# Patient Record
Sex: FEMALE | Race: WHITE | NOT HISPANIC OR LATINO | Employment: OTHER | URBAN - METROPOLITAN AREA
[De-identification: names, ages, dates, MRNs, and addresses within clinical notes are randomized per-mention and may not be internally consistent; named-entity substitution may affect disease eponyms.]

---

## 2018-02-13 ENCOUNTER — OFFICE VISIT CONVERTED (OUTPATIENT)
Dept: FAMILY MEDICINE CLINIC | Facility: CLINIC | Age: 63
End: 2018-02-13
Attending: FAMILY MEDICINE

## 2018-08-30 ENCOUNTER — OFFICE VISIT CONVERTED (OUTPATIENT)
Dept: FAMILY MEDICINE CLINIC | Facility: CLINIC | Age: 63
End: 2018-08-30
Attending: FAMILY MEDICINE

## 2018-09-12 ENCOUNTER — OFFICE VISIT CONVERTED (OUTPATIENT)
Dept: ORTHOPEDIC SURGERY | Facility: CLINIC | Age: 63
End: 2018-09-12
Attending: ORTHOPAEDIC SURGERY

## 2018-09-24 ENCOUNTER — OFFICE VISIT CONVERTED (OUTPATIENT)
Dept: FAMILY MEDICINE CLINIC | Facility: CLINIC | Age: 63
End: 2018-09-24
Attending: FAMILY MEDICINE

## 2019-03-05 ENCOUNTER — OFFICE VISIT CONVERTED (OUTPATIENT)
Dept: FAMILY MEDICINE CLINIC | Facility: CLINIC | Age: 64
End: 2019-03-05
Attending: FAMILY MEDICINE

## 2019-03-05 ENCOUNTER — HOSPITAL ENCOUNTER (OUTPATIENT)
Dept: FAMILY MEDICINE CLINIC | Facility: CLINIC | Age: 64
Discharge: HOME | End: 2019-03-05
Attending: FAMILY MEDICINE

## 2019-03-08 LAB
AMOXICILLIN+CLAV SUSC ISLT: 8
AMPICILLIN SUSC ISLT: >=32
AMPICILLIN+SULBAC SUSC ISLT: >=32
BACTERIA UR CULT: ABNORMAL
CEFAZOLIN SUSC ISLT: <=4
CEFEPIME SUSC ISLT: <=1
CEFTAZIDIME SUSC ISLT: <=1
CEFTRIAXONE SUSC ISLT: <=1
CEFUROXIME ORAL SUSC ISLT: 2
CEFUROXIME PARENTER SUSC ISLT: 2
CIPROFLOXACIN SUSC ISLT: <=0.25
ERTAPENEM SUSC ISLT: <=0.5
GENTAMICIN SUSC ISLT: <=1
LEVOFLOXACIN SUSC ISLT: <=0.12
NITROFURANTOIN SUSC ISLT: 32
TETRACYCLINE SUSC ISLT: >=16
TMP SMX SUSC ISLT: <=20
TOBRAMYCIN SUSC ISLT: <=1

## 2019-07-29 ENCOUNTER — HOSPITAL ENCOUNTER (OUTPATIENT)
Dept: LAB | Facility: HOSPITAL | Age: 64
Discharge: HOME | End: 2019-07-29
Attending: NURSE PRACTITIONER

## 2019-07-29 ENCOUNTER — OFFICE VISIT CONVERTED (OUTPATIENT)
Dept: FAMILY MEDICINE CLINIC | Facility: CLINIC | Age: 64
End: 2019-07-29
Attending: NURSE PRACTITIONER

## 2019-07-29 LAB
25(OH)D3 SERPL-MCNC: 33 NG/ML (ref 30–100)
ALBUMIN SERPL-MCNC: 4.4 G/DL (ref 3.5–5)
ALBUMIN/GLOB SERPL: 1.5 {RATIO} (ref 1.4–2.6)
ALP SERPL-CCNC: 89 U/L (ref 43–160)
ALT SERPL-CCNC: 16 U/L (ref 10–40)
ANION GAP SERPL CALC-SCNC: 18 MMOL/L (ref 8–19)
AST SERPL-CCNC: 19 U/L (ref 15–50)
BASOPHILS # BLD AUTO: 0.08 10*3/UL (ref 0–0.2)
BASOPHILS NFR BLD AUTO: 1.4 % (ref 0–3)
BILIRUB SERPL-MCNC: 0.38 MG/DL (ref 0.2–1.3)
BUN SERPL-MCNC: 12 MG/DL (ref 5–25)
BUN/CREAT SERPL: 17 {RATIO} (ref 6–20)
CALCIUM SERPL-MCNC: 9.6 MG/DL (ref 8.7–10.4)
CHLORIDE SERPL-SCNC: 101 MMOL/L (ref 99–111)
CHOLEST SERPL-MCNC: 162 MG/DL (ref 107–200)
CHOLEST/HDLC SERPL: 2.9 {RATIO} (ref 3–6)
CONV ABS IMM GRAN: 0.02 10*3/UL (ref 0–0.2)
CONV CO2: 26 MMOL/L (ref 22–32)
CONV IMMATURE GRAN: 0.4 % (ref 0–1.8)
CONV TOTAL PROTEIN: 7.3 G/DL (ref 6.3–8.2)
CREAT UR-MCNC: 0.69 MG/DL (ref 0.5–0.9)
DEPRECATED RDW RBC AUTO: 42.6 FL (ref 36.4–46.3)
EOSINOPHIL # BLD AUTO: 0.22 10*3/UL (ref 0–0.7)
EOSINOPHIL # BLD AUTO: 3.9 % (ref 0–7)
ERYTHROCYTE [DISTWIDTH] IN BLOOD BY AUTOMATED COUNT: 12.9 % (ref 11.7–14.4)
EST. AVERAGE GLUCOSE BLD GHB EST-MCNC: 114 MG/DL
GFR SERPLBLD BASED ON 1.73 SQ M-ARVRAT: >60 ML/MIN/{1.73_M2}
GLOBULIN UR ELPH-MCNC: 2.9 G/DL (ref 2–3.5)
GLUCOSE SERPL-MCNC: 91 MG/DL (ref 65–99)
HBA1C MFR BLD: 12.5 G/DL (ref 12–16)
HBA1C MFR BLD: 5.6 % (ref 3.5–5.7)
HCT VFR BLD AUTO: 38.3 % (ref 37–47)
HDLC SERPL-MCNC: 56 MG/DL (ref 40–60)
LDLC SERPL CALC-MCNC: 75 MG/DL (ref 70–100)
LYMPHOCYTES # BLD AUTO: 1.82 10*3/UL (ref 1–5)
MCH RBC QN AUTO: 29.8 PG (ref 27–31)
MCHC RBC AUTO-ENTMCNC: 32.6 G/DL (ref 33–37)
MCV RBC AUTO: 91.4 FL (ref 81–99)
MONOCYTES # BLD AUTO: 0.43 10*3/UL (ref 0.2–1.2)
MONOCYTES NFR BLD AUTO: 7.6 % (ref 3–10)
NEUTROPHILS # BLD AUTO: 3.09 10*3/UL (ref 2–8)
NEUTROPHILS NFR BLD AUTO: 54.5 % (ref 30–85)
NRBC CBCN: 0 % (ref 0–0.7)
OSMOLALITY SERPL CALC.SUM OF ELEC: 291 MOSM/KG (ref 273–304)
PLATELET # BLD AUTO: 337 10*3/UL (ref 130–400)
PMV BLD AUTO: 9.9 FL (ref 9.4–12.3)
POTASSIUM SERPL-SCNC: 3.9 MMOL/L (ref 3.5–5.3)
RBC # BLD AUTO: 4.19 10*6/UL (ref 4.2–5.4)
SODIUM SERPL-SCNC: 141 MMOL/L (ref 135–147)
T4 FREE SERPL-MCNC: 1.2 NG/DL (ref 0.9–1.8)
TRIGL SERPL-MCNC: 157 MG/DL (ref 40–150)
TSH SERPL-ACNC: 2.53 M[IU]/L (ref 0.27–4.2)
VARIANT LYMPHS NFR BLD MANUAL: 32.2 % (ref 20–45)
VLDLC SERPL-MCNC: 31 MG/DL (ref 5–37)
WBC # BLD AUTO: 5.66 10*3/UL (ref 4.8–10.8)

## 2019-08-12 ENCOUNTER — HOSPITAL ENCOUNTER (OUTPATIENT)
Dept: FAMILY MEDICINE CLINIC | Facility: CLINIC | Age: 64
Discharge: HOME | End: 2019-08-12
Attending: NURSE PRACTITIONER

## 2019-08-12 ENCOUNTER — CONVERSION ENCOUNTER (OUTPATIENT)
Dept: FAMILY MEDICINE CLINIC | Facility: CLINIC | Age: 64
End: 2019-08-12

## 2019-08-12 ENCOUNTER — OFFICE VISIT CONVERTED (OUTPATIENT)
Dept: FAMILY MEDICINE CLINIC | Facility: CLINIC | Age: 64
End: 2019-08-12
Attending: NURSE PRACTITIONER

## 2019-08-14 LAB
CONV LAST MENSTURAL PERIOD: NORMAL
SPECIMEN SOURCE: NORMAL
SPECIMEN SOURCE: NORMAL
THIN PREP CVX: NORMAL

## 2019-09-13 ENCOUNTER — HOSPITAL ENCOUNTER (OUTPATIENT)
Dept: GENERAL RADIOLOGY | Facility: HOSPITAL | Age: 64
Discharge: HOME | End: 2019-09-13
Attending: NURSE PRACTITIONER

## 2019-10-02 ENCOUNTER — OFFICE VISIT CONVERTED (OUTPATIENT)
Dept: SURGERY | Facility: CLINIC | Age: 64
End: 2019-10-02
Attending: NURSE PRACTITIONER

## 2019-10-02 ENCOUNTER — CONVERSION ENCOUNTER (OUTPATIENT)
Dept: SURGERY | Facility: CLINIC | Age: 64
End: 2019-10-02

## 2019-12-20 ENCOUNTER — OFFICE VISIT CONVERTED (OUTPATIENT)
Dept: FAMILY MEDICINE CLINIC | Facility: CLINIC | Age: 64
End: 2019-12-20
Attending: NURSE PRACTITIONER

## 2020-02-07 ENCOUNTER — HOSPITAL ENCOUNTER (OUTPATIENT)
Dept: SURGERY | Facility: HOSPITAL | Age: 65
Setting detail: HOSPITAL OUTPATIENT SURGERY
Discharge: HOME | End: 2020-02-07
Attending: SURGERY

## 2020-03-26 ENCOUNTER — HOSPITAL ENCOUNTER (OUTPATIENT)
Dept: LAB | Facility: HOSPITAL | Age: 65
Discharge: HOME | End: 2020-03-26
Attending: NURSE PRACTITIONER

## 2020-03-26 LAB
ALBUMIN SERPL-MCNC: 4.4 G/DL (ref 3.5–5)
ALBUMIN/GLOB SERPL: 1.6 {RATIO} (ref 1.4–2.6)
ALP SERPL-CCNC: 80 U/L (ref 43–160)
ALT SERPL-CCNC: 15 U/L (ref 10–40)
ANION GAP SERPL CALC-SCNC: 19 MMOL/L (ref 8–19)
AST SERPL-CCNC: 18 U/L (ref 15–50)
BASOPHILS # BLD AUTO: 0.11 10*3/UL (ref 0–0.2)
BASOPHILS NFR BLD AUTO: 1.6 % (ref 0–3)
BILIRUB SERPL-MCNC: 0.35 MG/DL (ref 0.2–1.3)
BUN SERPL-MCNC: 13 MG/DL (ref 5–25)
BUN/CREAT SERPL: 18 {RATIO} (ref 6–20)
CALCIUM SERPL-MCNC: 9.7 MG/DL (ref 8.7–10.4)
CHLORIDE SERPL-SCNC: 100 MMOL/L (ref 99–111)
CHOLEST SERPL-MCNC: 179 MG/DL (ref 107–200)
CHOLEST/HDLC SERPL: 2.8 {RATIO} (ref 3–6)
CONV ABS IMM GRAN: 0.01 10*3/UL (ref 0–0.2)
CONV CO2: 24 MMOL/L (ref 22–32)
CONV IMMATURE GRAN: 0.1 % (ref 0–1.8)
CONV TOTAL PROTEIN: 7.1 G/DL (ref 6.3–8.2)
CREAT UR-MCNC: 0.71 MG/DL (ref 0.5–0.9)
DEPRECATED RDW RBC AUTO: 42.4 FL (ref 36.4–46.3)
EOSINOPHIL # BLD AUTO: 0.48 10*3/UL (ref 0–0.7)
EOSINOPHIL # BLD AUTO: 7.1 % (ref 0–7)
ERYTHROCYTE [DISTWIDTH] IN BLOOD BY AUTOMATED COUNT: 12.9 % (ref 11.7–14.4)
GFR SERPLBLD BASED ON 1.73 SQ M-ARVRAT: >60 ML/MIN/{1.73_M2}
GLOBULIN UR ELPH-MCNC: 2.7 G/DL (ref 2–3.5)
GLUCOSE SERPL-MCNC: 109 MG/DL (ref 65–99)
HCT VFR BLD AUTO: 35.8 % (ref 37–47)
HDLC SERPL-MCNC: 63 MG/DL (ref 40–60)
HGB BLD-MCNC: 11.9 G/DL (ref 12–16)
LDLC SERPL CALC-MCNC: 78 MG/DL (ref 70–100)
LYMPHOCYTES # BLD AUTO: 2.58 10*3/UL (ref 1–5)
LYMPHOCYTES NFR BLD AUTO: 38.1 % (ref 20–45)
MCH RBC QN AUTO: 29.8 PG (ref 27–31)
MCHC RBC AUTO-ENTMCNC: 33.2 G/DL (ref 33–37)
MCV RBC AUTO: 89.5 FL (ref 81–99)
MONOCYTES # BLD AUTO: 0.62 10*3/UL (ref 0.2–1.2)
MONOCYTES NFR BLD AUTO: 9.1 % (ref 3–10)
NEUTROPHILS # BLD AUTO: 2.98 10*3/UL (ref 2–8)
NEUTROPHILS NFR BLD AUTO: 44 % (ref 30–85)
NRBC CBCN: 0 % (ref 0–0.7)
OSMOLALITY SERPL CALC.SUM OF ELEC: 289 MOSM/KG (ref 273–304)
PLATELET # BLD AUTO: 346 10*3/UL (ref 130–400)
PMV BLD AUTO: 10.1 FL (ref 9.4–12.3)
POTASSIUM SERPL-SCNC: 3.8 MMOL/L (ref 3.5–5.3)
RBC # BLD AUTO: 4 10*6/UL (ref 4.2–5.4)
SODIUM SERPL-SCNC: 139 MMOL/L (ref 135–147)
TRIGL SERPL-MCNC: 192 MG/DL (ref 40–150)
TSH SERPL-ACNC: 2.71 M[IU]/L (ref 0.27–4.2)
VLDLC SERPL-MCNC: 38 MG/DL (ref 5–37)
WBC # BLD AUTO: 6.78 10*3/UL (ref 4.8–10.8)

## 2020-04-02 ENCOUNTER — TELEMEDICINE CONVERTED (OUTPATIENT)
Dept: FAMILY MEDICINE CLINIC | Facility: CLINIC | Age: 65
End: 2020-04-02
Attending: NURSE PRACTITIONER

## 2020-11-24 ENCOUNTER — HOSPITAL ENCOUNTER (OUTPATIENT)
Dept: OTHER | Facility: HOSPITAL | Age: 65
Discharge: HOME | End: 2020-11-24
Attending: STUDENT IN AN ORGANIZED HEALTH CARE EDUCATION/TRAINING PROGRAM

## 2020-11-24 ENCOUNTER — OFFICE VISIT CONVERTED (OUTPATIENT)
Dept: INTERNAL MEDICINE | Facility: CLINIC | Age: 65
End: 2020-11-24
Attending: STUDENT IN AN ORGANIZED HEALTH CARE EDUCATION/TRAINING PROGRAM

## 2020-11-24 LAB
BASOPHILS # BLD AUTO: 0.09 10*3/UL (ref 0–0.2)
BASOPHILS NFR BLD AUTO: 1.1 % (ref 0–3)
CONV ABS IMM GRAN: 0.02 10*3/UL (ref 0–0.2)
CONV IMMATURE GRAN: 0.2 % (ref 0–1.8)
DEPRECATED RDW RBC AUTO: 41.8 FL (ref 36.4–46.3)
EOSINOPHIL # BLD AUTO: 0.36 10*3/UL (ref 0–0.7)
EOSINOPHIL # BLD AUTO: 4.4 % (ref 0–7)
ERYTHROCYTE [DISTWIDTH] IN BLOOD BY AUTOMATED COUNT: 12.8 % (ref 11.7–14.4)
ERYTHROCYTE [SEDIMENTATION RATE] IN BLOOD: 23 MM/H (ref 0–30)
HCT VFR BLD AUTO: 37.1 % (ref 37–47)
HGB BLD-MCNC: 12.2 G/DL (ref 12–16)
LYMPHOCYTES # BLD AUTO: 2.31 10*3/UL (ref 1–5)
LYMPHOCYTES NFR BLD AUTO: 28.5 % (ref 20–45)
MCH RBC QN AUTO: 29.3 PG (ref 27–31)
MCHC RBC AUTO-ENTMCNC: 32.9 G/DL (ref 33–37)
MCV RBC AUTO: 89.2 FL (ref 81–99)
MONOCYTES # BLD AUTO: 0.59 10*3/UL (ref 0.2–1.2)
MONOCYTES NFR BLD AUTO: 7.3 % (ref 3–10)
NEUTROPHILS # BLD AUTO: 4.74 10*3/UL (ref 2–8)
NEUTROPHILS NFR BLD AUTO: 58.5 % (ref 30–85)
NRBC CBCN: 0 % (ref 0–0.7)
PLATELET # BLD AUTO: 317 10*3/UL (ref 130–400)
PMV BLD AUTO: 9.7 FL (ref 9.4–12.3)
RBC # BLD AUTO: 4.16 10*6/UL (ref 4.2–5.4)
WBC # BLD AUTO: 8.11 10*3/UL (ref 4.8–10.8)

## 2020-11-25 ENCOUNTER — HOSPITAL ENCOUNTER (OUTPATIENT)
Dept: OTHER | Facility: HOSPITAL | Age: 65
Discharge: HOME | End: 2020-11-25
Attending: STUDENT IN AN ORGANIZED HEALTH CARE EDUCATION/TRAINING PROGRAM

## 2020-11-25 LAB
ALBUMIN SERPL-MCNC: 4.3 G/DL (ref 3.5–5)
ALBUMIN/GLOB SERPL: 1.7 {RATIO} (ref 1.4–2.6)
ALP SERPL-CCNC: 91 U/L (ref 43–160)
ALT SERPL-CCNC: 25 U/L (ref 10–40)
ANION GAP SERPL CALC-SCNC: 15 MMOL/L (ref 8–19)
AST SERPL-CCNC: 28 U/L (ref 15–50)
BILIRUB SERPL-MCNC: 0.44 MG/DL (ref 0.2–1.3)
BUN SERPL-MCNC: 13 MG/DL (ref 5–25)
BUN/CREAT SERPL: 16 {RATIO} (ref 6–20)
C DIFF TOX B STL QL CT TISS CULT: NEGATIVE
CALCIUM SERPL-MCNC: 9.5 MG/DL (ref 8.7–10.4)
CHLORIDE SERPL-SCNC: 102 MMOL/L (ref 99–111)
CONV 027 TOXIN: NEGATIVE
CONV CO2: 25 MMOL/L (ref 22–32)
CONV TOTAL PROTEIN: 6.9 G/DL (ref 6.3–8.2)
CREAT UR-MCNC: 0.82 MG/DL (ref 0.5–0.9)
CRP SERPL-MCNC: 3.1 MG/L (ref 0–5)
GFR SERPLBLD BASED ON 1.73 SQ M-ARVRAT: >60 ML/MIN/{1.73_M2}
GLOBULIN UR ELPH-MCNC: 2.6 G/DL (ref 2–3.5)
GLUCOSE SERPL-MCNC: 87 MG/DL (ref 65–99)
OSMOLALITY SERPL CALC.SUM OF ELEC: 285 MOSM/KG (ref 273–304)
POTASSIUM SERPL-SCNC: 3.8 MMOL/L (ref 3.5–5.3)
SODIUM SERPL-SCNC: 138 MMOL/L (ref 135–147)
TSH SERPL-ACNC: 1.54 M[IU]/L (ref 0.27–4.2)

## 2020-11-27 LAB
BACTERIA SPEC AEROBE CULT: NORMAL
CONV CELIAC DISEASE AB-IGA: 204 MG/DL (ref 68–408)
TTG IGA SER-ACNC: <2 U/ML (ref 0–3)

## 2020-12-08 ENCOUNTER — TELEMEDICINE CONVERTED (OUTPATIENT)
Dept: INTERNAL MEDICINE | Facility: CLINIC | Age: 65
End: 2020-12-08
Attending: STUDENT IN AN ORGANIZED HEALTH CARE EDUCATION/TRAINING PROGRAM

## 2021-01-21 ENCOUNTER — HOSPITAL ENCOUNTER (OUTPATIENT)
Dept: OTHER | Facility: HOSPITAL | Age: 66
Discharge: HOME | End: 2021-01-21
Attending: STUDENT IN AN ORGANIZED HEALTH CARE EDUCATION/TRAINING PROGRAM

## 2021-01-22 LAB — SARS-COV-2 RNA SPEC QL NAA+PROBE: NOT DETECTED

## 2021-03-15 ENCOUNTER — HOSPITAL ENCOUNTER (OUTPATIENT)
Dept: OTHER | Facility: HOSPITAL | Age: 66
Discharge: HOME | End: 2021-03-15
Attending: PHYSICIAN ASSISTANT

## 2021-03-15 ENCOUNTER — CONVERSION ENCOUNTER (OUTPATIENT)
Dept: INTERNAL MEDICINE | Facility: CLINIC | Age: 66
End: 2021-03-15

## 2021-03-15 ENCOUNTER — OFFICE VISIT CONVERTED (OUTPATIENT)
Dept: INTERNAL MEDICINE | Facility: CLINIC | Age: 66
End: 2021-03-15
Attending: PHYSICIAN ASSISTANT

## 2021-03-15 LAB
BILIRUB UR QL STRIP: NEGATIVE
COLOR UR: YELLOW
CONV CLARITY OF URINE: CLEAR
CONV PROTEIN IN URINE BY AUTOMATED TEST STRIP: NEGATIVE
CONV UROBILINOGEN IN URINE BY AUTOMATED TEST STRIP: NORMAL
GLUCOSE UR QL: NEGATIVE
HGB UR QL STRIP: NORMAL
KETONES UR QL STRIP: NEGATIVE
LEUKOCYTE ESTERASE UR QL STRIP: NORMAL
NITRITE UR QL STRIP: NEGATIVE
PH UR STRIP.AUTO: 5.5 [PH]
SP GR UR: NORMAL

## 2021-03-17 ENCOUNTER — HOSPITAL ENCOUNTER (OUTPATIENT)
Dept: VACCINE CLINIC | Facility: HOSPITAL | Age: 66
Discharge: HOME | End: 2021-03-17
Attending: INTERNAL MEDICINE

## 2021-03-17 LAB
AMPICILLIN SUSC ISLT: >=32
AMPICILLIN+SULBAC SUSC ISLT: 8
BACTERIA UR CULT: ABNORMAL
CEFAZOLIN SUSC ISLT: <=4
CEFEPIME SUSC ISLT: <=0.12
CEFTAZIDIME SUSC ISLT: <=1
CEFTRIAXONE SUSC ISLT: <=0.25
CIPROFLOXACIN SUSC ISLT: <=0.25
ERTAPENEM SUSC ISLT: <=0.12
GENTAMICIN SUSC ISLT: <=1
LEVOFLOXACIN SUSC ISLT: <=0.12
NITROFURANTOIN SUSC ISLT: 128
PIP+TAZO SUSC ISLT: <=4
TMP SMX SUSC ISLT: <=20
TOBRAMYCIN SUSC ISLT: <=1

## 2021-04-07 ENCOUNTER — HOSPITAL ENCOUNTER (OUTPATIENT)
Dept: VACCINE CLINIC | Facility: HOSPITAL | Age: 66
Discharge: HOME | End: 2021-04-07
Attending: INTERNAL MEDICINE

## 2021-05-10 NOTE — H&P
"   History and Physical      Patient Name: Stormy Hoffman   Patient ID: [id number]   Sex: Female   YOB: 1955    Primary Care Provider: Esther HANKINS   Referring Provider: Esther HANKINS    Visit Date: November 24, 2020    Provider: Tressa Schulte MD   Location: [facility]   Location Address: [address] [place] [address]  [id number]   Location Phone: [phone]          Chief Complaint  · Est Care  · Diarrhea more than I think I should. I have tried avoiding certain foods and nothing changed.   · I have a hx of always having hematuria  · I took myslef off simvastatin due to side effects, would like to talk about alternatives.      History Of Present Illness  Stormy Hoffman is a 65 year old /White female who presents for evaluation and treatment of:      last cscope: 03/2020 [facility] WNL  previous PCP: Sammy Chandra [place]      hx of microscopic hematuria  Has seen Dr. Wiseman (16yrs ago), cystoscopy was normal. Repeat evaluation with Dr. Moon (6-7yrs ago), urology, with repeat cystoscopy was also normal.     Diarrhea:   Chronic hx of soft and intermittent lose stools reporting tendency to \"not be constipated\". Stools are typically normal to loose but more loose stools over the past several months. thought lactose intolerance, since symptoms worsening came on after a night of having yogurt followed by some milk and cereal. Stool frequency of 4-5x per day at times, with sensation of urgency at times. Stools are intermittently watery, but mostly softer. Denies noticing any blood, ?mucous. No abdominal pain, nausea or vomiting. No recent travel. Has had testing for Covid which was negative. Has had to wake up in the middle of the night to defecate. Denies any change in weight. No recent abx course. She was on taking cranberry tablets for dysuria which she thought could be contributing, but no change noted.     hx of HLD previously on " Simvastatin:  She stopped this agent about 2 months ago due to leg pain. Similar symptoms with Lipitor.       Past Medical History  Disease Name Date Onset Notes   Allergies --  --    Anxiety --  --    Arthritis --  --    Asymptomatic microscopic hematuria 08/07/2017 --    Edema --  --    Fatigue 07/29/2019 --    Hematuria --  --    Hyperlipidemia --  --    Kidney stone --  --    Osteoarthritis, hand, primary localized, unspecified laterality 02/13/2018 --    Primary osteoarthritis of right knee 09/24/2018 --    Restless leg syndrome --  --    Seasonal allergies --  --    Sinus trouble --  --          Past Surgical History  Procedure Name Date Notes   Tubal ligation 07/1990 1988         Medication List  Name Date Started Instructions   Aspir-81 81 mg oral tablet,delayed release (DR/EC)  --    buspirone 15 mg oral tablet 10/20/2020 TAKE ONE-HALF TABLET BY MOUTH TWICE DAILY   Calcium 500 500 mg calcium (1,250 mg) oral tablet  take 1 tablet by oral route daily   cyanocobalamin (vitamin B-12) 1,000 mcg oral tablet  take 1 tablet by oral route daily   Fish Oil oral  --    lisinopril 10 mg oral tablet 04/02/2020 take 1 tablet (10 mg) by oral route once daily for 30 days   pramipexole 0.5 mg oral tablet 10/07/2020 take 1 tablet (0.5 mg) by oral route 2-3 hours before bedtime for 30 days   turmeric 400 mg oral capsule  take 2.5 capsules by oral route daily   Tylenol PM Extra Strength  mg oral tablet  take 1 tablet by oral route daily   Vitamin D-3 with Aloe 120-1,000-10 mg-unit-mg oral tablet  take 1 tablet by oral route daily         Allergy List  Allergen Name Date Reaction Notes   nitrofurantoin --  --  --        Allergies Reconciled  Family Medical History  Disease Name Relative/Age Notes   Stroke Mother/   Mother   Diabetes, unspecified type Father/   Father   Family history of certain chronic disabling diseases; arthritis Brother/  Mother/  Sister/   Mother; Sister; Brother   Osteoporosis Mother/  Sister/    "Mother; Sister         Social History  Finding Status Start/Stop Quantity Notes   Alcohol Light --/-- --  03/05/2019 -    Alcohol Use --  --/-- --  03/05/2019 - occasionally drinks   lives with spouse --  --/-- --  --    . --  --/-- --  --    Recreational Drug Use Never --/-- --  no   Tobacco Former --/-- 1ppd [id number]   Working --  --/-- --  --          Immunizations  NameDate Admin Mfg Trade Name Lot Number Route Inj VIS Given VIS Publication   Zxuupmiuu02/20/2019 [facility] Fluzone Quadrivalent [id number] IM RA 12/20/2019    Comments:          Review of Systems  · Constitutional  o Denies  o : fever, fatigue, weight loss, weight gain  · Cardiovascular  o Denies  o : lower extremity edema, claudication, chest pressure, palpitations  · Respiratory  o Denies  o : shortness of breath, wheezing, frequent cough, hemoptysis, dyspnea on exertion  · Gastrointestinal  o Denies  o : nausea, vomiting, diarrhea, constipation, abdominal pain  · Neurologic  o Denies  o : altered mental status, muscular weakness, incoordination  · Musculoskeletal  o Admits  o : joint pain  o Denies  o : muscle pain  · Psychiatric  o Denies  o : anxiety, depression      Vitals  Date Time BP Position Site L\R Cuff Size HR RR TEMP (F) WT  HT  BMI kg/m2 BSA m2 O2 Sat FR L/min FiO2 HC       08/12/2019 09:46 /78 Sitting                 12/20/2019 09:17 /69 Sitting    74 - R 14 96.2 171lbs 8oz 5'  2.5\" 30.87 1.85 98 %      11/24/2020 01:52 /78 Sitting    75 - R 16 98.2 179lbs 2oz 5'  2.5\" 32.24 1.89 99 %  21%          Physical Examination  · Constitutional  o Appearance  o : no acute distress, well-nourished  · Head and Face  o Head  o :   § Inspection  § : atraumatic, normocephalic  · Eyes  o Eyes  o : extraocular movements intact, no scleral icterus, no conjunctival injection  · Ears, Nose, Mouth and Throat  o Ears  o :   § External Ears  § : normal  o Nose  o :   § Intranasal Exam  § : nares patent  o Oral Cavity  o :   § Oral " Mucosa  § : moist mucous membranes  o Throat  o :   § Oropharynx  § : no inflammation or lesions present  · Neck  o Thyroid  o : gland size normal, nontender, no nodules or masses present on palpation, symmetric  · Respiratory  o Respiratory Effort  o : breathing comfortably, symmetric chest rise  o Auscultation of Lungs  o : clear to asculatation bilaterally, no wheezes, rales, or rhonchii  · Cardiovascular  o Heart  o :   § Auscultation of Heart  § : regular rate and rhythm, no murmurs, rubs, or gallops  o Peripheral Vascular System  o :   § Extremities  § : no edema  · Gastrointestinal  o Abdominal Examination  o :   § Abdomen  § : bowel sounds present, soft, non-distended, non-tender  · Neurologic  o Mental Status Examination  o :   § Orientation  § : grossly oriented to person, place and time  o Gait and Station  o :   § Gait Screening  § : normal gait  · Psychiatric  o General  o : normal mood and affect          Assessment  · Anxiety     300.02/F41.1  Chronic and stable.  · Hyperlipidemia     272.4/E78.5  Chronic and stable. Reviewed lipid panel from March which was significant for elevated TG, normal total Chol, LDL and HDL. ASCVD 10 yr risk calculated at 4.6%. Given risk <5% and patient with myalgia with atorvastatin and simvastatin will hold off re-initiating statin therapy at this time. Repeat lipid panel with year labs.   · Screening for depression     V79.0/Z13.89  · Diarrhea     787.91/R19.7  Chronic and worsening. Exact etiology is unclear at this time. Worrisome features include urgency and patient having to wake up at night to defecate. Lack of abdominal pain is reassuring. Will obtain labs and stool studies for further evaluation. We did discuss potential need for GI referral for colonoscopy with biopsy if symptoms persist and lab studies are unrevealing. Discussed red flag symptoms that would warrant RTC.   · Establishing care with new doctor, encounter for     V65.8/Z76.89  Pt presenting to  establish care with new provider. She has concerned re: diarrhea of several months duration and would like to discuss need for statin today. She is up to date most recommended screenings including colonoscopy (reviewed recent colonoscopy from Feb 2020 with findings of diverticulosis with next screen in 10yrs), mammogram (last in Sept 2019 and WNL per patient, next in 2021) and PAP (last in August 2019, nxt in 3 yrs)exams. Will need to review if Dexa scan has ever been obtained.   · Restless leg     333.94/G25.81  chronic and stable on current regimen  · Microscopic hematuria     599.72/R31.29  Chronic and presumed stable. has had w/u x2 for this in the past which were negative. No recent UA noted on review of EMR. UA with next yearly labs.   · Medication refill     V68.1/Z76.0  Refilled all medications.     Problems Reconciled  Plan  · Orders  o CBC with Auto Diff [facility] (33548) - V65.8/Z76.89, 787.91/R19.7 - 11/24/2020  o CMP [facility] (23245) - V65.8/Z76.89, 787.91/R19.7 - 11/24/2020  o TSH [facility] (36341) - V65.8/Z76.89 - 11/24/2020  o ACO-39: Current medications updated and reviewed (, 1159F) - V65.8/Z76.89, 787.91/R19.7, 300.02/F41.1, 333.94/G25.81 - 11/24/2020  o ACO-14: Influenza immunization administered or previously received [facility] () - V65.8/Z76.89 - 11/24/2020  o ESR (14135) - 787.91/R19.7 - 11/24/2020  o CRP (Inflammation) [facility] (60246) - 787.91/R19.7 - 11/24/2020  o Stool culture (57194, 79847) - 787.91/R19.7 - 11/24/2020  o Giardia and Cryptosporidium Enzyme Immunoassay [facility] (98762, 29048) - 787.91/R19.7 - 11/24/2020  o C difficile Toxigenic Assay (PCR) [facility] (12932) - 787.91/R19.7 - 11/24/2020  o Lactoferrin (Fecal) Qualitative (39554) - 787.91/R19.7 - 11/24/2020  o Osmotic gap panel stool (Na, K, osmotic gap) (05144) - 787.91/R19.7 - 11/24/2020  o Celiac disease panel (25036, 55843) - 787.91/R19.7 - 11/24/2020  · Medications  o Aspir-81 81 mg oral tablet,delayed release (DR/EC)   SIG: take 1 tablet by oral  route daily for 30 days   DISP: (30) Tablet with 3 refills  Prescribed on 11/24/2020     o Calcium 500 500 mg calcium (1,250 mg) oral tablet   SIG: take 1 tablet by oral route daily for 30 days   DISP: (30) Tablet with 3 refills  Prescribed on 11/24/2020     o cyanocobalamin (vitamin B-12) 1,000 mcg oral tablet   SIG: take 1 tablet by oral route daily for 30 days   DISP: (30) Tablet with 3 refills  Prescribed on 11/24/2020     o turmeric 400 mg oral capsule   SIG: take 2.5 capsules by oral route daily for 30 days   DISP: (75) Capsule with 3 refills  Prescribed on 11/24/2020     o Tylenol PM Extra Strength  mg oral tablet   SIG: take 1 tablet by oral route daily for 30 days   DISP: (30) Tablet with 3 refills  Prescribed on 11/24/2020     o Vitamin D-3 with Aloe 120-1,000-10 mg-unit-mg oral tablet   SIG: take 1 tablet by oral route daily for 30 days   DISP: (30) Tablet with 3 refills  Prescribed on 11/24/2020     o buspirone 15 mg oral tablet   SIG: TAKE ONE-HALF TABLET BY MOUTH TWICE DAILY   DISP: (30) Tablet with 3 refills  Adjusted on 11/24/2020     o lisinopril 10 mg oral tablet   SIG: take 1 tablet (10 mg) by oral route once daily for 30 days   DISP: (30) Tablet with 3 refills  Adjusted on 11/24/2020     o pramipexole 0.5 mg oral tablet   SIG: take 1 tablet (0.5 mg) by oral route 2-3 hours before bedtime for 30 days   DISP: (30) Tablet with 3 refills  Adjusted on 11/24/2020     o simvastatin 40 mg oral tablet   SIG: take 1 tablet (40 mg) by oral route once daily in the evening for 90 days   DISP: (90) Tablet with 1 refills  Discontinued on 11/24/2020     o Medications have been Reconciled  o Transition of Care or Provider Policy  · Instructions  o Advised that cheeses and other sources of dairy fats, animal fats, fast food, and the extras (candy, pastries, pies, doughnuts and cookies) all contain LDL raising nutrients. Advised to increase fruits, vegetables, whole grains, and to monitor portion sizes.    o Depression Screen completed and scanned into the EMR under the designated folder within the patient's documents.  o The provider screening met the required time of 15 minutes.  o Take all medications as prescribed/directed.  o Patient instructed/educated on their diet and exercise program.  o Patient was educated/instructed on their diagnosis, treatment and medications prior to discharge from the clinic today.  o Patient was instructed to exercise regularly.  o Call the office with any concerns or questions.  · Disposition  o Follow up 2 weeks.            Electronically Signed by: Tressa Schulte MD -Author on November 24, 2020 11:35:49 PM

## 2021-05-12 NOTE — PROGRESS NOTES
Quick Note      Patient Name: Stormy Hoffman   Patient ID: [id number]   Sex: Female   YOB: 1955    Primary Care Provider: Esther HANKINS   Referring Provider: Esther HANKINS    Visit Date: April 2, 2020    Provider: CR Sevilla   Location: [facility]   Location Address: [address] [place] [address], [address] [place] [address]  [id number]   Location Phone: [phone]          History Of Present Illness  TELEHEALTH VISIT  Chief Complaint: f/u bloodwork   Stormy Hoffman is a 64 year old /White female who is presenting for evaluation via telehealth visit. Verbal consent obtained before beginning visit. pt was alone during conference   Provider spent 15 minutes with the patient during telephone visit.   The following staff were present during this visit: Allen DUMONT   Past Medical History/Overview of Patient Symptoms     HPI: pt f/u labs, has no new concerns, needs refills    ROS:  ent: denies any URI symptoms  lungs:denies coughing. she's still working  cardio: she does monitor her b/p and it has been stable  gastro: she was loosing wt but she admits to being a stress eater and had gained back 4 lbs. no other problems  psych: doing well, denies any anxiety  neuro: doing well on requip for her RLS    no physical exam teleconference           Assessment  · Anemia     285.9/D64.9  · Essential hypertension     401.9/I10  · Hyperlipidemia     272.4/E78.5  · RLS (restless legs syndrome)     333.94/G25.81  · Weight loss     783.21/R63.4      Plan  · Orders  o Physical, Primary Care Panel (CBC, CMP, Lipid, TSH) [facility] (96850, 71621, 22794, 38753) - 401.9/I10, 272.4/E78.5, 285.9/D64.9, 333.94/G25.81 - 10/02/2020  · Medications  o Allegra Allergy 180 mg oral tablet   SIG: take 1 tablet (180 mg) by oral route once daily for 90 days   DISP: (90) tablet with 1 refills  Adjusted on 04/02/2020     o Contrave 8-90 mg oral tablet extended release   SIG: take 1 tablet by oral route once daily  in the morning for 30 days   DISP: (30) Tablet with 6 refills  Adjusted on 04/02/2020     o lisinopril 10 mg oral tablet   SIG: take 1 tablet (10 mg) by oral route once daily for 30 days   DISP: (30) Tablet with 6 refills  Adjusted on 04/02/2020     o pramipexole 0.5 mg oral tablet   SIG: take 1 tablet (0.5 mg) by oral route 2-3 hours before bedtime for 90 days   DISP: (1) Tablet with 1 refills  Adjusted on 04/02/2020     o simvastatin 40 mg oral tablet   SIG: take 1 tablet (40 mg) by oral route once daily in the evening for 90 days   DISP: (90) Tablet with 1 refills  Adjusted on 04/02/2020     o Medications have been Reconciled  o Transition of Care or Provider Policy  · Instructions  o Advised that cheeses and other sources of dairy fats, animal fats, fast food, and the extras (candy, pastries, pies, doughnuts and cookies) all contain LDL raising nutrients. Advised to increase fruits, vegetables, whole grains, and to monitor portion sizes.   o Plan Of Care:   o Take all medications as prescribed/directed.  o reviewed labs with patient  · Disposition  o Follow up in 6 months            Electronically Signed by: Esther Scales APRN -Author on April 3, 2020 01:09:39 PM

## 2021-05-13 NOTE — PROGRESS NOTES
"   Progress Note      Patient Name: Stormy Hoffman   Patient ID: [id number]   Sex: Female   YOB: 1955    Primary Care Provider: Tressa Schulte MD   Referring Provider: Tressa Schulte MD    Visit Date: December 8, 2020    Provider: Tressa Schulte MD   Location: [facility]   Location Address: [address] [place] [address] [address]  [place], [place]  [id number]   Location Phone: [phone]          Chief Complaint  · \"A two week follow up and go over my blood work.\"  · \"I also wanted to discuss my tremors that are getting worse.\"      History Of Present Illness  Video Conferencing Visit  Stormy Hoffman is a 65 year old /White female who is presenting for evaluation via video conferencing via [employer] delmy. Verbal consent obtained before beginning visit.   The following staff were present during this visit: Tressa Schulte MD   Stormy Hoffman is a 65 year old /White female who presents for evaluation and treatment of:      Tremor:   Patient reports family hx of hereditary tremors with 3 brothers with tremor issues. She herself has suffered from tremors for many years but is concerned that they may be getting worse. Tremors are noted over bilateral hands, arms and head. Typically occurs when she is actively reaching for something or using her UE, however has occurred at rest at times. Patient reports symptoms are not bothersome to her and do not affect her ADL or work, however her  is the one with the concern for worsening tremors.  Denies any imbalance or gait abnormalities. No personality changes. No family hx of Parkinson disease.     Diarrhea:   Subacute to chronic for which she was evaluated during our last visit. Patient today reports diarrhea self resolved, she is now back to her usual softer stools.       Past Medical History  Disease Name Date Onset Notes   Allergies --  --    Anxiety --  --    Arthritis --  --    Asymptomatic microscopic hematuria " 08/07/2017 --    Diarrhea 11/24/2020 Chronic and worsening. Exact etiology is unclear at this time. Worrisome features include urgency and patient having to wake up at night to defecate. Lack of abdominal pain is reassuring. Will obtain labs and stool studies for further evaluation. We did discuss potential need for GI referral for colonoscopy with biopsy if symptoms persist and lab studies are unrevealing. Discussed red flag symptoms that would warrant RTC.    Edema --  --    Fatigue 07/29/2019 --    Hematuria --  --    Hyperlipidemia --  --    Kidney stone --  --    Osteoarthritis, hand, primary localized, unspecified laterality 02/13/2018 --    Primary osteoarthritis of right knee 09/24/2018 --    Restless leg syndrome --  --    Seasonal allergies --  --    Sinus trouble --  --          Past Surgical History  Procedure Name Date Notes   Tubal ligation 07/1990 1988         Medication List  Name Date Started Instructions   Aspir-81 81 mg oral tablet,delayed release (DR/EC) 11/24/2020 take 1 tablet by oral route daily for 30 days   buspirone 15 mg oral tablet 11/24/2020 TAKE ONE-HALF TABLET BY MOUTH TWICE DAILY   Calcium 500 500 mg calcium (1,250 mg) oral tablet 11/24/2020 take 1 tablet by oral route daily for 30 days   cyanocobalamin (vitamin B-12) 1,000 mcg oral tablet 11/24/2020 take 1 tablet by oral route daily for 30 days   Fish Oil oral  --    lisinopril 10 mg oral tablet 11/24/2020 take 1 tablet (10 mg) by oral route once daily for 30 days   pramipexole 0.5 mg oral tablet 11/24/2020 take 1 tablet (0.5 mg) by oral route 2-3 hours before bedtime for 30 days   turmeric 400 mg oral capsule 11/24/2020 take 2.5 capsules by oral route daily for 30 days   Tylenol PM Extra Strength  mg oral tablet 11/24/2020 take 1 tablet by oral route daily for 30 days   Vitamin D-3 with Aloe 120-1,000-10 mg-unit-mg oral tablet 11/24/2020 take 1 tablet by oral route daily for 30 days         Allergy List  Allergen Name Date  Reaction Notes   nitrofurantoin --  --  --          Family Medical History  Disease Name Relative/Age Notes   Stroke Mother/   Mother   Diabetes, unspecified type Father/   Father   Family history of certain chronic disabling diseases; arthritis Brother/  Mother/  Sister/   Mother; Sister; Brother   Osteoporosis Mother/  Sister/   Mother; Sister         Social History  Finding Status Start/Stop Quantity Notes   Alcohol Light --/-- --  03/05/2019 -    Alcohol Use --  --/-- --  03/05/2019 - occasionally drinks   lives with spouse --  --/-- --  --    . --  --/-- --  --    Recreational Drug Use Never --/-- --  no   Tobacco Former --/-- 1ppd [id number]   Working --  --/-- --  --          Immunizations  NameDate Admin Mfg Trade Name Lot Number Route Inj VIS Given VIS Publication   Gtdeuxhta25/20/2019 [facility] Fluzone Quadrivalent [id number] RA 12/20/2019    Comments:          Review of Systems  · Constitutional  o Denies  o : fever, fatigue, weight loss, weight gain  · Cardiovascular  o Denies  o : lower extremity edema, claudication, chest pressure, palpitations  · Respiratory  o Denies  o : shortness of breath, wheezing, frequent cough, hemoptysis, dyspnea on exertion  · Gastrointestinal  o Denies  o : nausea, vomiting, diarrhea, constipation, abdominal pain      Physical Examination     Limited since telehealth encounter  Patient well appearing, NAD and breathing comfortably.           Assessment  · Diarrhea     787.91/R19.7  Resolved. Extensive laboratory work up and stool studies were negative, results of which were discussed with patient today. We did discuss potential need for GI referral for colonoscopy with biopsy if symptoms recur. Discussed red flag symptoms that would warrant RTC.  · Essential tremor     333.1/G25.0  Chronic and stable. No obvious culprits noted on her medications that would be contributing. Labs are normal. She is on Mirapex for restless leg. Discussed available treatment options (i.e.  propranolol or primidone) however since symptoms are not debilitating/bothersome to patient, she has agreed to hold off initiating any treatment at this time.    Problems Reconciled  Plan  · Orders  o ACO-39: Current medications updated and reviewed (1159F, ) - 333.1/G25.0, 787.91/R19.7 - 12/08/2020  · Medications  o Medications have been Reconciled  o Transition of Care or Provider Policy  · Instructions  o Patient was educated/instructed on their diagnosis, treatment and medications prior to discharge from the clinic today.  o Call the office with any concerns or questions.  o Chronic conditions reviewed and taken into consideration for today's treatment plan.  · Disposition  o Follow up in 6 months.            Electronically Signed by: Tressa Schulte MD -Author on December 8, 2020 01:55:39 PM

## 2021-05-14 VITALS
WEIGHT: 179.12 LBS | TEMPERATURE: 98.2 F | RESPIRATION RATE: 16 BRPM | OXYGEN SATURATION: 99 % | SYSTOLIC BLOOD PRESSURE: 112 MMHG | HEART RATE: 75 BPM | HEIGHT: 62 IN | DIASTOLIC BLOOD PRESSURE: 78 MMHG | BODY MASS INDEX: 32.96 KG/M2

## 2021-05-14 VITALS
SYSTOLIC BLOOD PRESSURE: 130 MMHG | HEART RATE: 85 BPM | BODY MASS INDEX: 33.31 KG/M2 | WEIGHT: 181 LBS | OXYGEN SATURATION: 97 % | TEMPERATURE: 97.6 F | HEIGHT: 62 IN | DIASTOLIC BLOOD PRESSURE: 70 MMHG

## 2021-05-14 NOTE — PROGRESS NOTES
Progress Note      Patient Name: Stormy Hoffman   Patient ID: [id number]   Sex: Female   YOB: 1955    Primary Care Provider: Tressa Schulte MD   Referring Provider: Tressa Schulte MD    Visit Date: March 15, 2021    Provider: Siri Tapia PA-C   Location: [facility]   Location Address: [address] [place] [address], [facility]  [place], [place]  [id number]   Location Phone: [phone]          Chief Complaint  · UTI      History Of Present Illness  Stormy Hoffman is a 65 year old /White female who presents for evaluation and treatment of:      UTI- symptoms started 3/11, had horrible pain in right side that lasted 1 hour, took motrin and pain improved. has taken some AZO, cloudy urine, urgency and frequency. denies any burning or pain during urination.  had a kidney stone about 5 years ago, has had UTI before  Pain now resolved.  Denies vaginal discharge, itching, odor.   Denies fever, NVDC, abd pain.  Pt wears pad for incontinence.  Admits to using scented soap. Pt takes baths.       Past Medical History  Disease Name Date Onset Notes   Allergies --  --    Anxiety --  --    Arthritis --  --    Asymptomatic microscopic hematuria 08/07/2017 --    Diarrhea 11/24/2020 Chronic and worsening. Exact etiology is unclear at this time. Worrisome features include urgency and patient having to wake up at night to defecate. Lack of abdominal pain is reassuring. Will obtain labs and stool studies for further evaluation. We did discuss potential need for GI referral for colonoscopy with biopsy if symptoms persist and lab studies are unrevealing. Discussed red flag symptoms that would warrant RTC.    Edema --  --    Fatigue 07/29/2019 --    Hematuria --  --    Hyperlipidemia --  --    Kidney stone --  --    Osteoarthritis, hand, primary localized, unspecified laterality 02/13/2018 --    Primary osteoarthritis of right knee 09/24/2018 --    Restless leg syndrome --  --    Seasonal allergies --  --     Sinus trouble --  --          Past Surgical History  Procedure Name Date Notes   Tubal ligation 07/1990 1988         Medication List  Name Date Started Instructions   Aspir-81 81 mg oral tablet,delayed release (DR/EC) 11/24/2020 take 1 tablet by oral route daily for 30 days   buspirone 15 mg oral tablet 11/24/2020 TAKE ONE-HALF TABLET BY MOUTH TWICE DAILY   Calcium 500 500 mg calcium (1,250 mg) oral tablet 11/24/2020 take 1 tablet by oral route daily for 30 days   cyanocobalamin (vitamin B-12) 1,000 mcg oral tablet 11/24/2020 take 1 tablet by oral route daily for 30 days   Fish Oil oral  --    lisinopril 10 mg oral tablet 11/24/2020 take 1 tablet (10 mg) by oral route once daily for 30 days   pramipexole 0.5 mg oral tablet 11/24/2020 take 1 tablet (0.5 mg) by oral route 2-3 hours before bedtime for 30 days   turmeric 400 mg oral capsule 11/24/2020 take 2.5 capsules by oral route daily for 30 days   Tylenol PM Extra Strength  mg oral tablet 11/24/2020 take 1 tablet by oral route daily for 30 days   Vitamin D-3 with Aloe 120-1,000-10 mg-unit-mg oral tablet 11/24/2020 take 1 tablet by oral route daily for 30 days         Allergy List  Allergen Name Date Reaction Notes   nitrofurantoin --  --  --        Allergies Reconciled  Family Medical History  Disease Name Relative/Age Notes   Stroke Mother/   Mother   Diabetes, unspecified type Father/   Father   Family history of certain chronic disabling diseases; arthritis Brother/  Mother/  Sister/   Mother; Sister; Brother   Osteoporosis Mother/  Sister/   Mother; Sister         Social History  Finding Status Start/Stop Quantity Notes   Alcohol Light --/-- --  03/05/2019 -    Alcohol Use --  --/-- --  03/05/2019 - occasionally drinks   lives with spouse --  --/-- --  --    . --  --/-- --  --    Recreational Drug Use Never --/-- --  no   Tobacco Former --/-- 1ppd [id number]   Working --  --/-- --  --          Immunizations  NameDate Admin Mfg Trade Name Lot  "Number Route Inj VIS Given VIS Publication   Ndpiuaoki53/20/2019 [facility] Fluzone Quadrivalent [id number] IM RA 12/20/2019    Comments:          Vitals  Date Time BP Position Site L\R Cuff Size HR RR TEMP (F) WT  HT  BMI kg/m2 BSA m2 O2 Sat FR L/min FiO2 HC       12/20/2019 09:17 /69 Sitting    74 - R 14 96.2 171lbs 8oz 5'  2.5\" 30.87 1.85 98 %      11/24/2020 01:52 /78 Sitting    75 - R 16 98.2 179lbs 2oz 5'  2.5\" 32.24 1.89 99 %  21%    03/15/2021 10:38 /70 Sitting    85 - R  97.6 181lbs 0oz 5'  2.5\" 32.58 1.9 97 %            Physical Examination  · Constitutional  o Appearance  o : no acute distress, well-nourished  · Head and Face  o Head  o :   § Inspection  § : atraumatic, normocephalic  · Eyes  o Eyes  o : extraocular movements intact, no scleral icterus, no conjunctival injection  · Ears, Nose, Mouth and Throat  o Ears  o :   § External Ears  § : normal  o Nose  o :   § Intranasal Exam  § : nares patent  o Oral Cavity  o :   § Oral Mucosa  § : moist mucous membranes  · Respiratory  o Respiratory Effort  o : breathing comfortably, symmetric chest rise  o Auscultation of Lungs  o : clear to asculatation bilaterally, no wheezes, rales, or rhonchii  · Cardiovascular  o Heart  o :   § Auscultation of Heart  § : regular rate and rhythm, no murmurs, rubs, or gallops  o Peripheral Vascular System  o :   § Extremities  § : no edema  · Gastrointestinal  o Abdominal Examination  o :   § Abdomen  § : bowel sounds present, non-distended, non-tender. No cva tenderness  · Skin and Subcutaneous Tissue  o General Inspection  o : no lesions present, no areas of discoloration, skin turgor normal  · Neurologic  o Mental Status Examination  o :   § Orientation  § : grossly oriented to person, place and time  o Gait and Station  o :   § Gait Screening  § : normal gait  · Psychiatric  o General  o : normal mood and affect          Results  · In-Office Procedures  o Lab procedure  § IOP - Urinalysis without Microscopy " ([facility]) [facility] ([id number])   § Color Ur: Yellow   § Clarity Ur: Clear   § Glucose Ur Ql Strip: Negative   § Bilirub Ur Ql Strip: Negative   § Ketones Ur Ql Strip: Negative   § Sp Gr Ur Qn: greater than 1.030   § Hgb Ur Ql Strip: Large   § pH Ur-LsCnc: 5.5   § Prot Ur Ql Strip: Negative   § Urobilinogen Ur Strip-mCnc: 0.2 E.U./dL   § Nitrite Ur Ql Strip: Negative   § WBC Est Ur Ql Strip: Small       Assessment  · Abdominal pain     789.00/R10.9  · Hematuria     599.70/R31.9  Discussed concern with blood in urine, pt states she has had this for years. It is possible that hematuria was caused by passing a stone when pain flared a few days ago. No need for CT or imaging today. increase water intake. Antibiotic sent today as well as urine culture. We will adjust medications if needed based on culture results. Monitor for worsening symptoms, fever, profuse vomiting, abdominal or back pain. Encouraged to avoid baths and scented soaps. Make sure to change pad as soon as it becomes moist. patient understands and agrees with plan.      Plan  · Orders  o Urine culture (20910, 57900) - 599.70/R31.9, 789.00/R10.9 - 03/15/2021  o ACO-39: Current medications updated and reviewed (, 1159F) - - 03/15/2021  · Medications  o Bactrim -160 mg oral tablet   SIG: take 1 tablet by oral route every 12 hours for 7 days   DISP: (14) Tablet with 0 refills  Prescribed on 03/15/2021     o Medications have been Reconciled  o Transition of Care or Provider Policy  · Instructions  o Instructed to seek medical attention urgently for new or worsening symptoms.  o Handouts were given to patient: uti  o Patient was educated/instructed on their diagnosis, treatment and medications prior to discharge from the clinic today.  o Electronically Identified Patient Education Materials Provided Electronically  · Disposition  o Call or Return if symptoms worsen or persist.  o Keep follow up appt as scheduled            Electronically Signed by: Siri  SIDRA Tapia -Author on March 16, 2021 09:41:40 AM

## 2021-05-15 VITALS
RESPIRATION RATE: 14 BRPM | HEIGHT: 62 IN | SYSTOLIC BLOOD PRESSURE: 144 MMHG | HEART RATE: 74 BPM | TEMPERATURE: 96.2 F | OXYGEN SATURATION: 98 % | DIASTOLIC BLOOD PRESSURE: 69 MMHG | WEIGHT: 171.5 LBS | BODY MASS INDEX: 31.56 KG/M2

## 2021-05-15 VITALS
RESPIRATION RATE: 25 BRPM | BODY MASS INDEX: 32.76 KG/M2 | WEIGHT: 178 LBS | OXYGEN SATURATION: 95 % | DIASTOLIC BLOOD PRESSURE: 56 MMHG | HEIGHT: 62 IN | HEART RATE: 78 BPM | SYSTOLIC BLOOD PRESSURE: 154 MMHG | TEMPERATURE: 95.1 F

## 2021-05-15 VITALS — DIASTOLIC BLOOD PRESSURE: 78 MMHG | SYSTOLIC BLOOD PRESSURE: 142 MMHG

## 2021-05-15 VITALS — BODY MASS INDEX: 32.39 KG/M2 | HEIGHT: 62 IN | RESPIRATION RATE: 14 BRPM | WEIGHT: 176 LBS

## 2021-05-15 VITALS
HEART RATE: 81 BPM | DIASTOLIC BLOOD PRESSURE: 64 MMHG | SYSTOLIC BLOOD PRESSURE: 145 MMHG | WEIGHT: 179.12 LBS | TEMPERATURE: 95.3 F | RESPIRATION RATE: 13 BRPM | HEIGHT: 62 IN | BODY MASS INDEX: 32.96 KG/M2 | OXYGEN SATURATION: 98 %

## 2021-05-16 VITALS
BODY MASS INDEX: 33.31 KG/M2 | SYSTOLIC BLOOD PRESSURE: 159 MMHG | DIASTOLIC BLOOD PRESSURE: 79 MMHG | WEIGHT: 181 LBS | HEIGHT: 62 IN | HEART RATE: 77 BPM | OXYGEN SATURATION: 97 %

## 2021-05-16 VITALS
WEIGHT: 177.5 LBS | SYSTOLIC BLOOD PRESSURE: 141 MMHG | HEIGHT: 62 IN | DIASTOLIC BLOOD PRESSURE: 80 MMHG | HEART RATE: 88 BPM | BODY MASS INDEX: 32.66 KG/M2 | OXYGEN SATURATION: 95 % | TEMPERATURE: 99.3 F

## 2021-05-16 VITALS
BODY MASS INDEX: 33.68 KG/M2 | HEIGHT: 62 IN | DIASTOLIC BLOOD PRESSURE: 62 MMHG | OXYGEN SATURATION: 97 % | WEIGHT: 183 LBS | SYSTOLIC BLOOD PRESSURE: 134 MMHG | HEART RATE: 80 BPM

## 2021-05-16 VITALS — WEIGHT: 185.12 LBS | BODY MASS INDEX: 34.07 KG/M2 | OXYGEN SATURATION: 92 % | HEART RATE: 91 BPM | HEIGHT: 62 IN

## 2021-05-16 VITALS
HEART RATE: 78 BPM | BODY MASS INDEX: 32.57 KG/M2 | DIASTOLIC BLOOD PRESSURE: 73 MMHG | OXYGEN SATURATION: 99 % | HEIGHT: 62 IN | SYSTOLIC BLOOD PRESSURE: 135 MMHG | WEIGHT: 177 LBS

## 2021-06-10 PROBLEM — F41.9 ANXIETY: Status: ACTIVE | Noted: 2021-06-10

## 2021-06-10 PROBLEM — M19.90 ARTHRITIS: Status: ACTIVE | Noted: 2021-06-10

## 2021-06-10 PROBLEM — J01.80 OTHER ACUTE SINUSITIS: Status: ACTIVE | Noted: 2021-06-10

## 2021-06-10 PROBLEM — R19.7 DIARRHEA: Status: ACTIVE | Noted: 2020-11-24

## 2021-06-10 PROBLEM — E78.5 HYPERLIPIDEMIA: Status: ACTIVE | Noted: 2021-06-10

## 2021-06-10 RX ORDER — PRAMIPEXOLE DIHYDROCHLORIDE 0.5 MG/1
1 TABLET ORAL DAILY
COMMUNITY
Start: 2021-04-14 | End: 2021-08-10 | Stop reason: SDUPTHER

## 2021-06-10 RX ORDER — ACETAMINOPHEN/DIPHENHYDRAMINE 500MG-25MG
1 TABLET ORAL DAILY
COMMUNITY
Start: 2021-04-14 | End: 2021-08-10 | Stop reason: SDUPTHER

## 2021-06-10 RX ORDER — BUSPIRONE HYDROCHLORIDE 15 MG/1
0.5 TABLET ORAL 2 TIMES DAILY
COMMUNITY
Start: 2021-04-14 | End: 2021-08-10 | Stop reason: SDUPTHER

## 2021-06-10 RX ORDER — LISINOPRIL 10 MG/1
1 TABLET ORAL DAILY
COMMUNITY
Start: 2021-04-14 | End: 2021-08-10 | Stop reason: SDUPTHER

## 2021-06-10 RX ORDER — IBUPROFEN 200 MG
1 CAPSULE ORAL DAILY
COMMUNITY
Start: 2021-04-14 | End: 2021-08-10 | Stop reason: SDUPTHER

## 2021-06-10 RX ORDER — ASPIRIN 81 MG/1
81 TABLET ORAL DAILY
COMMUNITY
End: 2021-08-10 | Stop reason: SDUPTHER

## 2021-06-11 ENCOUNTER — OFFICE VISIT (OUTPATIENT)
Dept: INTERNAL MEDICINE | Facility: CLINIC | Age: 66
End: 2021-06-11

## 2021-06-11 VITALS
TEMPERATURE: 97.8 F | DIASTOLIC BLOOD PRESSURE: 86 MMHG | HEART RATE: 69 BPM | SYSTOLIC BLOOD PRESSURE: 120 MMHG | BODY MASS INDEX: 33.27 KG/M2 | OXYGEN SATURATION: 98 % | HEIGHT: 62 IN | WEIGHT: 180.8 LBS

## 2021-06-11 DIAGNOSIS — I10 ESSENTIAL HYPERTENSION: Primary | ICD-10-CM

## 2021-06-11 DIAGNOSIS — T78.40XD ALLERGY, SUBSEQUENT ENCOUNTER: ICD-10-CM

## 2021-06-11 DIAGNOSIS — N39.3 STRESS INCONTINENCE OF URINE: ICD-10-CM

## 2021-06-11 PROCEDURE — 99214 OFFICE O/P EST MOD 30 MIN: CPT | Performed by: STUDENT IN AN ORGANIZED HEALTH CARE EDUCATION/TRAINING PROGRAM

## 2021-06-11 RX ORDER — FLUTICASONE PROPIONATE 50 MCG
2 SPRAY, SUSPENSION (ML) NASAL DAILY
Qty: 15.8 ML | Refills: 2 | Status: SHIPPED | OUTPATIENT
Start: 2021-06-11 | End: 2021-08-10 | Stop reason: SDUPTHER

## 2021-06-11 RX ORDER — FEXOFENADINE HCL 180 MG/1
180 TABLET ORAL DAILY
COMMUNITY
End: 2021-08-10 | Stop reason: SDUPTHER

## 2021-06-11 RX ORDER — MONTELUKAST SODIUM 10 MG/1
10 TABLET ORAL NIGHTLY
Qty: 30 TABLET | Refills: 3 | Status: SHIPPED | OUTPATIENT
Start: 2021-06-11 | End: 2021-08-10 | Stop reason: SDUPTHER

## 2021-06-11 RX ORDER — TOLTERODINE TARTRATE 1 MG/1
1 TABLET, EXTENDED RELEASE ORAL 2 TIMES DAILY
Qty: 60 TABLET | Refills: 0 | Status: SHIPPED | OUTPATIENT
Start: 2021-06-11 | End: 2021-07-12 | Stop reason: SDUPTHER

## 2021-06-11 NOTE — PROGRESS NOTES
Chief Complaint  Cough, Follow-up, Allergic Rhinitis, and Hypertension (has previously had- wants to discuss)    Subjective          Stormy Hoffman presents to [facility]  History of Present Illness    Allergies:  Cough for the past few months. Thought it was related to her medications.   2 wks ago went to the [facility] and was prescribed doxycycline which helped.   She has been off for a couple days.   She is on Allegara everyday and benadryl at night.   However her symptoms are persisting, especially drainage which is causing her to cough.   She has been claritin in the past which did not help. Zyrtec OTC options have helped.   She has never been on singulair before.     HTN:   Chronic and stable on lisinopril. Came off her lisinoprio for 2-3 weeks to see if this was causing her cough.   BP ranging from 120-mid 130's.   BP of 118/70 at [facility].   At [facility] 161/80, and at home it was 137/80?.       Urinary incontinence:  Chronic, however not well controlled especially now in light of cough. States she is having a lot more leakage and would like to discuss treatment options. Denies dysuria. Endorses frequency which is chronic.       Past Medical History:   Diagnosis Date   • Allergies    • Anxiety    • Arthritis    • Asymptomatic microscopic hematuria 08/07/2017   • Diarrhea 11/24/2020    chronic and worsening, exact etiology is unclear at this time. worrisome features include urgency and pt having to wake upa t night to defecate. Lack of abdominal pain is reassuring. Will obtain labs and stool studies for fruther evaluation. We did discuss potential need for GI referral for colonoscopy with biopsy if symptoms persist and lab studies are unreavealing. Discussed red flag symptoms th   • Edema    • Fatigue 07/29/2019   • Hematuria    • Hyperlipidemia    • Kidney stone    • Osteoarthritis, hand, primary localized, unspecified laterality 02/13/2018   • Primary  osteoarthritis of right knee 2018   • Restless leg syndrome    • Seasonal allergies    • Sinus trouble        Allergies:   Allergies   Allergen Reactions   • Nitrofurantoin Anaphylaxis          Past Surgical History:   Procedure Laterality Date   • TUBAL ABDOMINAL LIGATION  1990          Social History     Socioeconomic History   • Marital status:      Spouse name: Not on file   • Number of children: Not on file   • Years of education: Not on file   • Highest education level: Not on file   Tobacco Use   • Smoking status: Former Smoker     Packs/day: 1.00     Years: 3.00     Pack years: 3.00     Start date:      Quit date:      Years since quittin.4   • Smokeless tobacco: Never Used   Vaping Use   • Vaping Use: Never used   Substance and Sexual Activity   • Alcohol use: Yes     Comment: light 3/5/19 occasionally drinks   • Drug use: Never         Family History   Problem Relation Age of Onset   • Stroke Mother    • Arthritis Mother    • Osteoporosis Mother    • Diabetes Father    • Arthritis Sister    • Osteoporosis Sister    • Arthritis Brother           Health Maintenance Due   Topic Date Due   • DXA SCAN  Never done   • ANNUAL PHYSICAL  Never done   • TDAP/TD VACCINES (1 - Tdap) Never done   • ZOSTER VACCINE (2 of 2) 2020   • Pneumococcal Vaccine 65+ (1 of 1 - PPSV23) Never done   • HEPATITIS C SCREENING  Never done   • LIPID PANEL  06/10/2021            Current Outpatient Medications:   •  aspirin (aspirin) 81 MG EC tablet, Take 81 mg by mouth Daily., Disp: , Rfl:   •  busPIRone (BUSPAR) 15 MG tablet, Take 0.5 tablets by mouth 2 (two) times a day. Take 1/2 tablet by mouth twice daily, Disp: , Rfl:   •  calcium carbonate (OS-PERLA) 1250 (500 Ca) MG tablet, Take 1 tablet by mouth Daily., Disp: , Rfl:   •  cyanocobalamin (VITAMIN B-12) 1000 MCG tablet, Take 1 tablet by mouth Daily., Disp: , Rfl:   •  diphenhydrAMINE-acetaminophen (Tylenol PM Extra Strength)  MG tablet  "per tablet, Take 1 tablet by mouth Daily., Disp: , Rfl:   •  fexofenadine (ALLEGRA) 180 MG tablet, Take 180 mg by mouth Daily., Disp: , Rfl:   •  lisinopril (PRINIVIL,ZESTRIL) 10 MG tablet, Take 1 tablet by mouth Daily., Disp: , Rfl:   •  pramipexole (MIRAPEX) 0.5 MG tablet, Take 1 tablet by mouth Daily., Disp: , Rfl:   •  fluticasone (Flonase) 50 MCG/ACT nasal spray, 2 sprays into the nostril(s) as directed by provider Daily for 30 days., Disp: 15.8 mL, Rfl: 2  •  montelukast (Singulair) 10 MG tablet, Take 1 tablet by mouth Every Night for 30 days., Disp: 30 tablet, Rfl: 3  •  sulfamethoxazole-trimethoprim (Bactrim DS) 800-160 MG per tablet, Take 1 tablet by mouth 2 (Two) Times a Day for 7 days., Disp: 14 tablet, Rfl: 0  •  tolterodine (Detrol) 1 MG tablet, Take 1 tablet by mouth 2 (Two) Times a Day for 30 days., Disp: 60 tablet, Rfl: 0      Immunization History   Administered Date(s) Administered   • Influenza, Unspecified 12/20/2019         Review of Systems   Per HPI     Objective       Vitals:    06/11/21 1103   BP: 120/86   Pulse: 69   Temp: 97.8 °F (36.6 °C)   SpO2: 98%   Weight: 82 kg (180 lb 12.8 oz)   Height: 157.5 cm (62\")     Body mass index is 33.07 kg/m².      Physical Exam  Vitals reviewed.   Constitutional:       Appearance: Normal appearance.   HENT:      Head: Normocephalic and atraumatic.      Nose: Congestion and rhinorrhea present.      Right Turbinates: Swollen and pale.      Left Turbinates: Swollen and pale.      Right Sinus: No maxillary sinus tenderness or frontal sinus tenderness.      Left Sinus: No maxillary sinus tenderness or frontal sinus tenderness.   Eyes:      Extraocular Movements: Extraocular movements intact.      Conjunctiva/sclera: Conjunctivae normal.   Cardiovascular:      Rate and Rhythm: Normal rate.   Pulmonary:      Effort: Pulmonary effort is normal. No respiratory distress.   Musculoskeletal:         General: Normal range of motion.   Skin:     General: Skin is warm " and dry.   Neurological:      General: No focal deficit present.      Mental Status: She is alert and oriented to person, place, and time.      Cranial Nerves: No cranial nerve deficit.   Psychiatric:         Mood and Affect: Mood normal.         Behavior: Behavior normal.         Thought Content: Thought content normal.             Result Review :                           Assessment and Plan      Diagnoses and all orders for this visit:    1. Essential hypertension (Primary)  Assessment & Plan:  Chronic and typically well-controlled during her visits in our office.  Discrepancy between office reads, home reads and readings at local [employer] is concerning.  Patient is advised to check her blood pressure at least twice a day morning and night 2-3 times a day over the next couple of weeks to keep a log and bring for review at follow-up in 3 to 4 weeks.  She is also advised to bring her own home BP to  for need for calibration as well.  Pending findings will plan to refer for ambulatory blood pressure monitoring.      2. Stress incontinence of urine  Assessment & Plan:  Chronic and worsening in light of interval increase cough in light of PND.  Will trial Detrol.      3. Allergy, subsequent encounter  Comments:  Chronic with current exacerbation.  Recommend Flonase and Singulair which were prescribed.  Orders:  -     fluticasone (Flonase) 50 MCG/ACT nasal spray; 2 sprays into the nostril(s) as directed by provider Daily for 30 days.  Dispense: 15.8 mL; Refill: 2  -     montelukast (Singulair) 10 MG tablet; Take 1 tablet by mouth Every Night for 30 days.  Dispense: 30 tablet; Refill: 3    Other orders  -     tolterodine (Detrol) 1 MG tablet; Take 1 tablet by mouth 2 (Two) Times a Day for 30 days.  Dispense: 60 tablet; Refill: 0            Follow Up     Return in about 4 weeks (around 7/9/2021) for HTN, urinary incontinece .    Patient was given instructions and counseling regarding her condition or for health  maintenance advice. Please see specific information pulled into the AVS if appropriate.     Tressa Schulte MD   Internal Medicine-Pediatrics

## 2021-06-11 NOTE — PATIENT INSTRUCTIONS
[employer] allows you to send messages to your doctor, view your test results, renew your prescriptions, schedule appointments, and more. To sign up, go to [employer] and click on the Sign Up Now link in the New User? box. Enter your [employer] Activation Code exactly as it appears below along with the last four digits of your Social Security Number and your Date of Birth () to complete the sign-up process. If you do not sign up before the expiration date, you must request a new code.    Xipin Activation Code: [email or web address]  Expires: 2021 10:51 AM    If you have questions, you can email [email or web address] or call [phone] to talk to our [employer] staff. Remember, Xipin is NOT to be used for urgent needs. For medical emergencies, dial 911.      Urinary Incontinence    Urinary incontinence refers to a condition in which a person is unable to control where and when to pass urine. A person with this condition will urinate when he or she does not mean to (involuntarily).  What are the causes?  This condition may be caused by:  · Medicines.  · Infections.  · Constipation.  · Overactive bladder muscles.  · Weak bladder muscles.  · Weak pelvic floor muscles. These muscles provide support for the bladder, intestine, and, in women, the uterus.  · Enlarged prostate in men. The prostate is a gland near the bladder. When it gets too big, it can pinch the urethra. With the urethra blocked, the bladder can weaken and lose the ability to empty properly.  · Surgery.  · Emotional factors, such as anxiety, stress, or post-traumatic stress disorder (PTSD).  · Pelvic organ prolapse. This happens in women when organs shift out of place and into the vagina. This shift can prevent the bladder and urethra from working properly.  What increases the risk?  The following factors may make you more likely to develop this condition:  · Older age.  · Obesity and physical inactivity.  · Pregnancy  and childbirth.  · Menopause.  · Diseases that affect the nerves or spinal cord (neurological diseases).  · Long-term (chronic) coughing. This can increase pressure on the bladder and pelvic floor muscles.  What are the signs or symptoms?  Symptoms may vary depending on the type of urinary incontinence you have. They include:  · A sudden urge to urinate, but passing urine involuntarily before you can get to a bathroom (urge incontinence).  · Suddenly passing urine with any activity that forces urine to pass, such as coughing, laughing, exercise, or sneezing (stress incontinence).  · Needing to urinate often, but urinating only a small amount, or constantly dribbling urine (overflow incontinence).  · Urinating because you cannot get to the bathroom in time due to a physical disability, such as arthritis or injury, or communication and thinking problems, such as Alzheimer disease (functional incontinence).  How is this diagnosed?  This condition may be diagnosed based on:  · Your medical history.  · A physical exam.  · Tests, such as:  ? Urine tests.  ? X-rays of your kidney and bladder.  ? Ultrasound.  ? CT scan.  ? Cystoscopy. In this procedure, a health care provider inserts a tube with a light and camera (cystoscope) through the urethra and into the bladder in order to check for problems.  ? Urodynamic testing. These tests assess how well the bladder, urethra, and sphincter can store and release urine. There are different types of urodynamic tests, and they vary depending on what the test is measuring.  To help diagnose your condition, your health care provider may recommend that you keep a log of when you urinate and how much you urinate.  How is this treated?  Treatment for this condition depends on the type of incontinence that you have and its cause. Treatment may include:  · Lifestyle changes, such as:  ? Quitting smoking.  ? Maintaining a healthy weight.  ? Staying active. Try to get 150 minutes of  moderate-intensity exercise every week. Ask your health care provider which activities are safe for you.  ? Eating a healthy diet.  § Avoid high-fat foods, like fried foods.  § Avoid refined carbohydrates like white bread and white rice.  § Limit how much alcohol and caffeine you drink.  § Increase your fiber intake. Foods such as fresh fruits, vegetables, beans, and whole grains are healthy sources of fiber.  · Pelvic floor muscle exercises.  · Bladder training, such as lengthening the amount of time between bathroom breaks, or using the bathroom at regular intervals.  · Using techniques to suppress bladder urges. This can include distraction techniques or controlled breathing exercises.  · Medicines to relax the bladder muscles and prevent bladder spasms.  · Medicines to help slow or prevent the growth of a man's prostate.  · Botox injections. These can help relax the bladder muscles.  · Using pulses of electricity to help change bladder reflexes (electrical nerve stimulation).  · For women, using a medical device to prevent urine leaks. This is a small, tampon-like, disposable device that is inserted into the urethra.  · Injecting collagen or carbon beads (bulking agents) into the urinary sphincter. These can help thicken tissue and close the bladder opening.  · Surgery.  Follow these instructions at home:  Lifestyle  · Limit alcohol and caffeine. These can fill your bladder quickly and irritate it.  · Keep yourself clean to help prevent odors and skin damage. Ask your doctor about special skin creams and cleansers that can protect the skin from urine.  · Consider wearing pads or adult diapers. Make sure to change them regularly, and always change them right after experiencing incontinence.  General instructions  · Take over-the-counter and prescription medicines only as told by your health care provider.  · Use the bathroom about every 3-4 hours, even if you do not feel the need to urinate. Try to empty your  bladder completely every time. After urinating, wait a minute. Then try to urinate again.  · Make sure you are in a relaxed position while urinating.  · If your incontinence is caused by nerve problems, keep a log of the medicines you take and the times you go to the bathroom.  · Keep all follow-up visits as told by your health care provider. This is important.  Contact a health care provider if:  · You have pain that gets worse.  · Your incontinence gets worse.  Get help right away if:  · You have a fever or chills.  · You are unable to urinate.  · You have redness in your groin area or down your legs.  Summary  · Urinary incontinence refers to a condition in which a person is unable to control where and when to pass urine.  · This condition may be caused by medicines, infection, weak bladder muscles, weak pelvic floor muscles, enlargement of the prostate (in men), or surgery.  · The following factors increase your risk for developing this condition: older age, obesity, pregnancy and childbirth, menopause, neurological diseases, and chronic coughing.  · There are several types of urinary incontinence. They include urge incontinence, stress incontinence, overflow incontinence, and functional incontinence.  · This condition is usually treated first with lifestyle and behavioral changes, such as quitting smoking, eating a healthier diet, and doing regular pelvic floor exercises. Other treatment options include medicines, bulking agents, medical devices, electrical nerve stimulation, or surgery.  This information is not intended to replace advice given to you by your health care provider. Make sure you discuss any questions you have with your health care provider.  Document Revised: 12/28/2018 Document Reviewed: 03/29/2018  [employer] Patient Education © 2021 [employer]

## 2021-06-16 ENCOUNTER — TELEPHONE (OUTPATIENT)
Dept: INTERNAL MEDICINE | Facility: CLINIC | Age: 66
End: 2021-06-16

## 2021-06-16 ENCOUNTER — CLINICAL SUPPORT (OUTPATIENT)
Dept: INTERNAL MEDICINE | Facility: CLINIC | Age: 66
End: 2021-06-16

## 2021-06-16 DIAGNOSIS — R82.998 LEUKOCYTES IN URINE: ICD-10-CM

## 2021-06-16 DIAGNOSIS — R35.0 URINARY FREQUENCY: ICD-10-CM

## 2021-06-16 DIAGNOSIS — R35.0 URINARY FREQUENCY: Primary | ICD-10-CM

## 2021-06-16 DIAGNOSIS — N30.01 ACUTE CYSTITIS WITH HEMATURIA: ICD-10-CM

## 2021-06-16 DIAGNOSIS — R31.0 GROSS HEMATURIA: Primary | ICD-10-CM

## 2021-06-16 LAB
BILIRUB UR QL STRIP: NEGATIVE
CLARITY UR: ABNORMAL
COLOR UR: YELLOW
GLUCOSE UR STRIP-MCNC: NEGATIVE MG/DL
HGB UR QL STRIP.AUTO: ABNORMAL
KETONES UR QL STRIP: NEGATIVE
LEUKOCYTE ESTERASE UR QL STRIP.AUTO: ABNORMAL
NITRITE UR QL STRIP: NEGATIVE
PH UR STRIP.AUTO: 5.5 [PH] (ref 5–8)
PROT UR QL STRIP: ABNORMAL
SP GR UR STRIP: 1.02 (ref 1–1.03)
UROBILINOGEN UR QL STRIP: ABNORMAL

## 2021-06-16 PROCEDURE — 87186 SC STD MICRODIL/AGAR DIL: CPT | Performed by: STUDENT IN AN ORGANIZED HEALTH CARE EDUCATION/TRAINING PROGRAM

## 2021-06-16 PROCEDURE — 87077 CULTURE AEROBIC IDENTIFY: CPT | Performed by: STUDENT IN AN ORGANIZED HEALTH CARE EDUCATION/TRAINING PROGRAM

## 2021-06-16 PROCEDURE — 81003 URINALYSIS AUTO W/O SCOPE: CPT | Performed by: STUDENT IN AN ORGANIZED HEALTH CARE EDUCATION/TRAINING PROGRAM

## 2021-06-16 PROCEDURE — 87086 URINE CULTURE/COLONY COUNT: CPT | Performed by: STUDENT IN AN ORGANIZED HEALTH CARE EDUCATION/TRAINING PROGRAM

## 2021-06-16 RX ORDER — SULFAMETHOXAZOLE AND TRIMETHOPRIM 800; 160 MG/1; MG/1
1 TABLET ORAL 2 TIMES DAILY
Qty: 14 TABLET | Refills: 0 | Status: SHIPPED | OUTPATIENT
Start: 2021-06-16 | End: 2021-06-23

## 2021-06-16 NOTE — TELEPHONE ENCOUNTER
Caller: Stormy Hoffman    Relationship: Self    Best call back number: [phone]     What medication are you requesting: SOMETHING FOR UTI    What are your current symptoms: FREQUENT URGE TO URINATE AND INABILITY TO URINATE    How long have you been experiencing symptoms: 3 DAYS    Have you had these symptoms before:    [] Yes  [x] No    Have you been treated for these symptoms before:   [] Yes  [x] No    If a prescription is needed, what is your preferred pharmacy and phone number: [facility] [place] [address] - [phone] [facility] [phone] FX     Additional notes: PATIENT STATED THAT SHE JUST STARTED EXPERIENCING SYMPTOMS OF NEEDING TO FREQUENTLY URINATE, BUT BEING UNABLE TO URINATE WHEN GOING. SHE BELIEVES SHE HAS A UTI AND IS REQUESTING SOMETHING BE SENT IN TO HELP TREAT THIS AS SHE WILL BE GOING ON VACATION THIS UPCOMING WEEKEND. PLEASE ADVISE

## 2021-06-16 NOTE — TELEPHONE ENCOUNTER
Caller: Stormy Hoffman    Relationship: Self    Best call back number: [phone]     What is the best time to reach you: ANY    Who are you requesting to speak with (clinical staff, provider,  specific staff member): CLINICAL STAFF    What was the call regarding: PATIENT CALLED STATING THAT SHE WAS CALLED FROM THE OFFICE AND WAS CALLING BACK.    UNABLE TO WARM TRANSFER    Do you require a callback: YES

## 2021-06-18 LAB — BACTERIA SPEC AEROBE CULT: ABNORMAL

## 2021-06-21 PROBLEM — I10 ESSENTIAL HYPERTENSION: Status: ACTIVE | Noted: 2021-06-21

## 2021-06-21 PROBLEM — T78.40XA ALLERGIES: Status: ACTIVE | Noted: 2021-06-21

## 2021-06-21 PROBLEM — N39.3 STRESS INCONTINENCE OF URINE: Status: ACTIVE | Noted: 2021-06-21

## 2021-06-21 NOTE — ASSESSMENT & PLAN NOTE
Chronic and typically well-controlled during her visits in our office.  Discre[facility] between office reads, home [facility].  Patient is advised to check her blood pressure at least twice a day morning and night 2-3 times a day over the next couple of weeks to keep a log and bring for review at follow-up in 3 to 4 weeks.  She is also advised to bring her own home BP to AV for need for calibration as well.  Pending findings will plan to refer for ambulatory blood pressure monitoring.

## 2021-07-12 DIAGNOSIS — N30.01 ACUTE CYSTITIS WITH HEMATURIA: ICD-10-CM

## 2021-07-12 DIAGNOSIS — R35.0 URINARY FREQUENCY: ICD-10-CM

## 2021-07-12 NOTE — TELEPHONE ENCOUNTER
Caller: Stormy Hoffman    Relationship: Self    Best call back number: [phone]     Medication needed: DETROL      When do you need the refill by: 7/12    What additional details did the patient provide when requesting the medication: PATIENT WANTED TO INFORM THAT SCRIPT IS HELPING HER.     Does the patient have less than a 3 day supply:  [x] Yes  [] No    What is the patient's preferred pharmacy: [facility] [place] [address] [facility] [address] - [phone] [facility] [phone] FX

## 2021-07-13 RX ORDER — TOLTERODINE TARTRATE 1 MG/1
1 TABLET, EXTENDED RELEASE ORAL 2 TIMES DAILY
Qty: 60 TABLET | Refills: 0 | Status: SHIPPED | OUTPATIENT
Start: 2021-07-13 | End: 2021-08-10 | Stop reason: SDUPTHER

## 2021-07-14 RX ORDER — TOLTERODINE TARTRATE 1 MG/1
TABLET, EXTENDED RELEASE ORAL
Qty: 60 TABLET | Refills: 0 | OUTPATIENT
Start: 2021-07-14

## 2021-07-14 RX ORDER — SULFAMETHOXAZOLE AND TRIMETHOPRIM 800; 160 MG/1; MG/1
TABLET ORAL
Qty: 14 TABLET | Refills: 0 | OUTPATIENT
Start: 2021-07-14

## 2021-07-14 NOTE — TELEPHONE ENCOUNTER
Refusing medication as this is from the interphase request?   Unsure if patient actively needs this meds, in addition, antibiotics do not get blindly refilled.

## 2021-08-10 ENCOUNTER — TELEMEDICINE (OUTPATIENT)
Dept: INTERNAL MEDICINE | Facility: CLINIC | Age: 66
End: 2021-08-10

## 2021-08-10 DIAGNOSIS — F41.9 ANXIETY: ICD-10-CM

## 2021-08-10 DIAGNOSIS — M19.90 ARTHRITIS: ICD-10-CM

## 2021-08-10 DIAGNOSIS — T78.40XD ALLERGY, SUBSEQUENT ENCOUNTER: ICD-10-CM

## 2021-08-10 DIAGNOSIS — N39.3 STRESS INCONTINENCE OF URINE: ICD-10-CM

## 2021-08-10 DIAGNOSIS — Z79.899 MEDICATION MANAGEMENT: ICD-10-CM

## 2021-08-10 DIAGNOSIS — I10 ESSENTIAL HYPERTENSION: ICD-10-CM

## 2021-08-10 DIAGNOSIS — N39.0 RECURRENT UTI: ICD-10-CM

## 2021-08-10 DIAGNOSIS — A49.8 KLEBSIELLA PNEUMONIAE INFECTION: Primary | ICD-10-CM

## 2021-08-10 DIAGNOSIS — G25.81 RESTLESS LEG SYNDROME: ICD-10-CM

## 2021-08-10 DIAGNOSIS — N30.00 ACUTE CYSTITIS WITHOUT HEMATURIA: ICD-10-CM

## 2021-08-10 PROCEDURE — 99214 OFFICE O/P EST MOD 30 MIN: CPT | Performed by: STUDENT IN AN ORGANIZED HEALTH CARE EDUCATION/TRAINING PROGRAM

## 2021-08-10 RX ORDER — MONTELUKAST SODIUM 10 MG/1
10 TABLET ORAL NIGHTLY
Qty: 90 TABLET | Refills: 1 | Status: SHIPPED | OUTPATIENT
Start: 2021-08-10 | End: 2022-02-07 | Stop reason: SDUPTHER

## 2021-08-10 RX ORDER — ASPIRIN 81 MG/1
81 TABLET ORAL DAILY
Qty: 90 TABLET | Refills: 1 | Status: SHIPPED | OUTPATIENT
Start: 2021-08-10 | End: 2021-11-08

## 2021-08-10 RX ORDER — BUSPIRONE HYDROCHLORIDE 15 MG/1
7.5 TABLET ORAL 2 TIMES DAILY
Qty: 90 TABLET | Refills: 1 | Status: SHIPPED | OUTPATIENT
Start: 2021-08-10 | End: 2022-02-07 | Stop reason: SDUPTHER

## 2021-08-10 RX ORDER — FLUTICASONE PROPIONATE 50 MCG
2 SPRAY, SUSPENSION (ML) NASAL DAILY
Qty: 18.2 ML | Refills: 3 | Status: SHIPPED | OUTPATIENT
Start: 2021-08-10 | End: 2022-02-07 | Stop reason: SDUPTHER

## 2021-08-10 RX ORDER — PRAMIPEXOLE DIHYDROCHLORIDE 0.5 MG/1
0.5 TABLET ORAL DAILY
Qty: 90 TABLET | Refills: 1 | Status: SHIPPED | OUTPATIENT
Start: 2021-08-10 | End: 2022-02-07 | Stop reason: SDUPTHER

## 2021-08-10 RX ORDER — FEXOFENADINE HCL 180 MG/1
180 TABLET ORAL DAILY
Qty: 90 TABLET | Refills: 1 | Status: SHIPPED | OUTPATIENT
Start: 2021-08-10 | End: 2022-02-07 | Stop reason: SDUPTHER

## 2021-08-10 RX ORDER — LISINOPRIL 10 MG/1
10 TABLET ORAL DAILY
Qty: 90 TABLET | Refills: 1 | Status: SHIPPED | OUTPATIENT
Start: 2021-08-10 | End: 2022-02-07 | Stop reason: SDUPTHER

## 2021-08-10 RX ORDER — TOLTERODINE TARTRATE 1 MG/1
1 TABLET, EXTENDED RELEASE ORAL 2 TIMES DAILY
Qty: 180 TABLET | Refills: 1 | Status: SHIPPED | OUTPATIENT
Start: 2021-08-10 | End: 2022-02-09

## 2021-08-10 RX ORDER — ACETAMINOPHEN/DIPHENHYDRAMINE 500MG-25MG
1 TABLET ORAL DAILY PRN
Qty: 30 TABLET | Refills: 3 | Status: SHIPPED | OUTPATIENT
Start: 2021-08-10 | End: 2021-09-09

## 2021-08-10 RX ORDER — IBUPROFEN 200 MG
1 CAPSULE ORAL DAILY
Qty: 90 TABLET | Refills: 1 | Status: SHIPPED | OUTPATIENT
Start: 2021-08-10 | End: 2021-11-08

## 2021-10-25 ENCOUNTER — OFFICE VISIT (OUTPATIENT)
Dept: UROLOGY | Facility: CLINIC | Age: 66
End: 2021-10-25

## 2021-10-25 VITALS
DIASTOLIC BLOOD PRESSURE: 80 MMHG | HEIGHT: 62 IN | BODY MASS INDEX: 33.75 KG/M2 | SYSTOLIC BLOOD PRESSURE: 157 MMHG | WEIGHT: 183.4 LBS

## 2021-10-25 DIAGNOSIS — R31.29 MICROSCOPIC HEMATURIA: Primary | ICD-10-CM

## 2021-10-25 LAB
BILIRUB BLD-MCNC: NEGATIVE MG/DL
CLARITY, POC: CLEAR
COLOR UR: YELLOW
EXPIRATION DATE: ABNORMAL
GLUCOSE UR STRIP-MCNC: NEGATIVE MG/DL
KETONES UR QL: NEGATIVE
LEUKOCYTE EST, POC: NEGATIVE
Lab: ABNORMAL
NITRITE UR-MCNC: NEGATIVE MG/ML
PH UR: 5.5 [PH] (ref 5–8)
PROT UR STRIP-MCNC: NEGATIVE MG/DL
RBC # UR STRIP: ABNORMAL /UL
SP GR UR: 1.02 (ref 1–1.03)
UROBILINOGEN UR QL: NORMAL

## 2021-10-25 PROCEDURE — 81003 URINALYSIS AUTO W/O SCOPE: CPT | Performed by: UROLOGY

## 2021-10-25 PROCEDURE — 99202 OFFICE O/P NEW SF 15 MIN: CPT | Performed by: UROLOGY

## 2021-10-25 NOTE — ASSESSMENT & PLAN NOTE
She will have a CT scan with and without IV contrast to evaluate her kidneys and ureters and possibly cystoscopy to evaluate her bladder depending upon the CT scan results.  She is like to wait until after the first of the year to have this done and I think that is appropriate and fine.

## 2021-10-25 NOTE — PROGRESS NOTES
"Chief Complaint  Urinary Tract Infection (new patient)    Subjective          Stormy Hoffman presents to [facility]  States has had a long-term history of microscopic hematuria.  She states she saw Dr. Miner for several years ago and about 15 years ago saw Dr. Barry had a cystoscopy which was normal.  She is never had any gross hematuria.    Recently she had 2 rounds of UTI within 2 to 3 months.  Both of them were Klebsiella pneumonia.  Her urine does appear normal today.  She states she has a few infections each year.  She does have a history of kidney stones.      Objective   Vital Signs:   /80   Ht 157.5 cm (62\")   Wt 83.2 kg (183 lb 6.4 oz)   BMI 33.54 kg/m²     Physical Exam  Vitals and nursing note reviewed.   Constitutional:       Appearance: Normal appearance. She is well-developed.   Pulmonary:      Effort: Pulmonary effort is normal.      Breath sounds: Normal air entry.   Neurological:      Mental Status: She is alert and oriented to person, place, and time.      Motor: Motor function is intact.   Psychiatric:         Mood and Affect: Mood normal.         Behavior: Behavior normal.        Result Review :                  Results for orders placed or performed in visit on 10/25/21   POC Urinalysis Dipstick, Automated    Specimen: Urine   Result Value Ref Range    Color Yellow Yellow, Straw, Dark Yellow, Nusrat    Clarity, UA Clear Clear    Specific Gravity  1.025 (A) 1.005 - 1.030    pH, Urine 5.5 5.0 - 8.0    Leukocytes Negative Negative    Nitrite, UA Negative Negative    Protein, POC Negative Negative mg/dL    Glucose, UA Negative Negative, 1000 mg/dL (3+) mg/dL    Ketones, UA Negative Negative    Urobilinogen, UA Normal Normal    Bilirubin Negative Negative    Blood, UA Moderate (A) Negative    Lot Number [id number]     Expiration Date 09/30/22        Assessment and Plan    Diagnoses and all orders for this visit:    1. Microscopic hematuria (Primary)  Assessment & " Plan:  She will have a CT scan with and without IV contrast to evaluate her kidneys and ureters and possibly cystoscopy to evaluate her bladder depending upon the CT scan results.  She is like to wait until after the first of the year to have this done and I think that is appropriate and fine.    Orders:  -     POC Urinalysis Dipstick, Automated  -     CT Abdomen Pelvis With & Without Contrast; Future      Follow Up   No follow-ups on file.  Patient was given instructions and counseling regarding her condition or for health maintenance advice. Please see specific information pulled into the AVS if appropriate.

## 2021-10-26 ENCOUNTER — PATIENT ROUNDING (BHMG ONLY) (OUTPATIENT)
Dept: UROLOGY | Facility: CLINIC | Age: 66
End: 2021-10-26

## 2021-10-26 NOTE — PROGRESS NOTES
October 26, 2021    Hello, may I speak with Stormy Hoffman?    My name is MICHELE Jose     I am  with [facility] [facility]  [address]  [place] [address] [phone].    Before we get started may I verify your date of birth? 1955    I am calling to officially welcome you to our practice and ask about your recent visit. Is this a good time to talk? yes    Tell me about your visit with us. What things went well? It was a great visit. Dr. Joseph answered all my questions and ordered some imaging, which made the patient feel better. She said Dr. Joseph didn't mind her asking questions and was happy to answer them.        We're always looking for ways to make our patients' experiences even better. Do you have recommendations on ways we may improve?  no    Overall were you satisfied with your first visit to our practice? yes       I appreciate you taking the time to speak with me today. Is there anything else I can do for you? no      Thank you, and have a great day.

## 2022-02-07 DIAGNOSIS — F41.9 ANXIETY: ICD-10-CM

## 2022-02-07 DIAGNOSIS — G25.81 RESTLESS LEG SYNDROME: ICD-10-CM

## 2022-02-07 DIAGNOSIS — I10 ESSENTIAL HYPERTENSION: ICD-10-CM

## 2022-02-07 DIAGNOSIS — T78.40XD ALLERGY, SUBSEQUENT ENCOUNTER: ICD-10-CM

## 2022-02-07 NOTE — TELEPHONE ENCOUNTER
Caller: Garrett Hoffmananuj Angela    Relationship: Self    Best call back number: [phone]    Requested Prescriptions:   Requested Prescriptions     Pending Prescriptions Disp Refills   • busPIRone (BUSPAR) 15 MG tablet 90 tablet 1     Sig: Take 0.5 tablets by mouth. Take 1/2 tablet by mouth twice daily   • fexofenadine (ALLEGRA) 180 MG tablet 90 tablet 1     Sig: Take 1 tablet by mouth Daily for 90 days.   • pramipexole (MIRAPEX) 0.5 MG tablet 90 tablet 1     Sig: Take 1 tablet by mouth Daily for 90 days.   • montelukast (Singulair) 10 MG tablet 90 tablet 1     Sig: Take 1 tablet by mouth Every Night for 90 days.   • lisinopril (PRINIVIL,ZESTRIL) 10 MG tablet 90 tablet 1     Sig: Take 1 tablet by mouth Daily for 90 days.   • fluticasone (Flonase) 50 MCG/ACT nasal spray 18.2 mL 3     Si sprays into the nostril(s) as directed by provider Daily for 30 days.        Pharmacy where request should be sent: [facility] [place] [address] [address] - [phone] [facility] [phone]      Additional details provided by patient: THE PATIENT IS LEAVING ON A TRIP ON - AND WILL NEED REFILLS OF HER MEDICATION FROM PCP. PCP DOES NOT HAVE ANY APPOINTMENTS UNTIL 22. SHE WOULD LIKE A CALL BACK TO CONFIRM WITH NURSE    Does the patient have less than a 3 day supply:  [] Yes  [x] No    Naeem Barrientos Rep   22 11:43 EST

## 2022-02-08 DIAGNOSIS — M19.90 UNSPECIFIED OSTEOARTHRITIS, UNSPECIFIED SITE: ICD-10-CM

## 2022-02-08 DIAGNOSIS — G25.81 RESTLESS LEG SYNDROME: ICD-10-CM

## 2022-02-08 DIAGNOSIS — N39.3 STRESS INCONTINENCE OF URINE: ICD-10-CM

## 2022-02-08 NOTE — TELEPHONE ENCOUNTER
Patient is requesting a refill. She was last seen 08/10/21 and that was the first time and last time her RX was filled

## 2022-02-09 ENCOUNTER — HOSPITAL ENCOUNTER (OUTPATIENT)
Dept: CT IMAGING | Facility: HOSPITAL | Age: 67
Discharge: HOME | End: 2022-02-09
Admitting: UROLOGY

## 2022-02-09 DIAGNOSIS — R31.29 MICROSCOPIC HEMATURIA: ICD-10-CM

## 2022-02-09 LAB — CREAT BLDA-MCNC: 0.6 MG/DL

## 2022-02-09 PROCEDURE — 74178 CT ABD&PLV WO CNTR FLWD CNTR: CPT

## 2022-02-09 PROCEDURE — 0 IOPAMIDOL PER 1 ML: Performed by: UROLOGY

## 2022-02-09 PROCEDURE — 82565 ASSAY OF CREATININE: CPT

## 2022-02-09 RX ORDER — LISINOPRIL 10 MG/1
10 TABLET ORAL DAILY
Qty: 90 TABLET | Refills: 1 | Status: SHIPPED | OUTPATIENT
Start: 2022-02-09 | End: 2022-08-03

## 2022-02-09 RX ORDER — MONTELUKAST SODIUM 10 MG/1
10 TABLET ORAL NIGHTLY
Qty: 90 TABLET | Refills: 1 | Status: SHIPPED | OUTPATIENT
Start: 2022-02-09 | End: 2022-08-03

## 2022-02-09 RX ORDER — BUSPIRONE HYDROCHLORIDE 15 MG/1
7.5 TABLET ORAL 2 TIMES DAILY
Qty: 90 TABLET | Refills: 1 | Status: SHIPPED | OUTPATIENT
Start: 2022-02-09 | End: 2022-08-03 | Stop reason: SDUPTHER

## 2022-02-09 RX ORDER — PRAMIPEXOLE DIHYDROCHLORIDE 0.5 MG/1
0.5 TABLET ORAL DAILY
Qty: 90 TABLET | Refills: 1 | Status: SHIPPED | OUTPATIENT
Start: 2022-02-09 | End: 2022-08-03 | Stop reason: SDUPTHER

## 2022-02-09 RX ORDER — TOLTERODINE TARTRATE 1 MG/1
TABLET, EXTENDED RELEASE ORAL
Qty: 180 TABLET | Refills: 1 | Status: SHIPPED | OUTPATIENT
Start: 2022-02-09 | End: 2022-02-25 | Stop reason: SDUPTHER

## 2022-02-09 RX ORDER — PRAMIPEXOLE DIHYDROCHLORIDE 0.5 MG/1
TABLET ORAL
Qty: 90 TABLET | Refills: 1 | OUTPATIENT
Start: 2022-02-09

## 2022-02-09 RX ORDER — FEXOFENADINE HCL 180 MG/1
180 TABLET ORAL DAILY
Qty: 90 TABLET | Refills: 1 | Status: SHIPPED | OUTPATIENT
Start: 2022-02-09

## 2022-02-09 RX ORDER — FLUTICASONE PROPIONATE 50 MCG
2 SPRAY, SUSPENSION (ML) NASAL DAILY
Qty: 18.2 ML | Refills: 3 | Status: SHIPPED | OUTPATIENT
Start: 2022-02-09 | End: 2023-03-23

## 2022-02-09 RX ORDER — CALCIUM CARBONATE 500(1250)
TABLET ORAL
Qty: 90 TABLET | Refills: 1 | Status: SHIPPED | OUTPATIENT
Start: 2022-02-09 | End: 2022-10-11

## 2022-02-09 RX ADMIN — IOPAMIDOL 100 ML: 755 INJECTION, SOLUTION INTRAVENOUS at 15:12

## 2022-02-11 NOTE — PROGRESS NOTES
I called patient and gave her the results.  She has seen Dr. Dozier in the past. She will make an appt to see him.

## 2022-02-25 DIAGNOSIS — N39.3 STRESS INCONTINENCE OF URINE: ICD-10-CM

## 2022-02-25 RX ORDER — TOLTERODINE TARTRATE 1 MG/1
1 TABLET, EXTENDED RELEASE ORAL 2 TIMES DAILY
Qty: 180 TABLET | Refills: 1 | Status: SHIPPED | OUTPATIENT
Start: 2022-02-25 | End: 2022-08-03

## 2022-02-25 NOTE — TELEPHONE ENCOUNTER
Called spoke with patient about medication that had already been sent into pharmacy. Patient stated that it was too expensive at [facility]. She wanted it to go to [facility] because it was cheaper. I changed her pharmacy and sent medication over to the requested pharmacy. Patient stated that she would call [facility] to see if they received it.

## 2022-02-25 NOTE — TELEPHONE ENCOUNTER
Caller: Stormy Hoffman    Relationship: Self    Best call back number: [phone]    Requested Prescriptions:   Requested Prescriptions     Pending Prescriptions Disp Refills   • tolterodine (DETROL) 1 MG tablet 180 tablet 1     Sig: Take 1 tablet by mouth 2 (Two) Times a Day.        Pharmacy where request should be sent: [facility] [address], [place] - [address] - [phone] [facility] [phone] FX     Additional details provided by patient: PATIENT HAS ONE DAY LEFT OF MEDICATION.    PATIENT WANTED TO SEND THIS MEDICATION TO [facility] DUE TO BETTER PRICING FOR MEDICATION.     Does the patient have less than a 3 day supply:  [x] Yes  [] No    Naeem Quevedo Rep   02/25/22 11:49 EST

## 2022-03-04 ENCOUNTER — OFFICE VISIT (OUTPATIENT)
Dept: INTERNAL MEDICINE | Facility: CLINIC | Age: 67
End: 2022-03-04

## 2022-03-04 VITALS
SYSTOLIC BLOOD PRESSURE: 142 MMHG | DIASTOLIC BLOOD PRESSURE: 70 MMHG | RESPIRATION RATE: 18 BRPM | BODY MASS INDEX: 32.42 KG/M2 | TEMPERATURE: 98 F | HEART RATE: 88 BPM | HEIGHT: 62 IN | OXYGEN SATURATION: 99 % | WEIGHT: 176.2 LBS

## 2022-03-04 DIAGNOSIS — R10.9 ABDOMINAL PAIN, UNSPECIFIED ABDOMINAL LOCATION: ICD-10-CM

## 2022-03-04 DIAGNOSIS — R19.7 DIARRHEA, UNSPECIFIED TYPE: ICD-10-CM

## 2022-03-04 DIAGNOSIS — R09.81 CONGESTION OF NASAL SINUS: ICD-10-CM

## 2022-03-04 DIAGNOSIS — J32.9 SINUSITIS, UNSPECIFIED CHRONICITY, UNSPECIFIED LOCATION: Primary | ICD-10-CM

## 2022-03-04 DIAGNOSIS — M79.10 MYALGIA: ICD-10-CM

## 2022-03-04 LAB
ALBUMIN SERPL-MCNC: 4.4 G/DL (ref 3.5–5.2)
ALBUMIN/GLOB SERPL: 1.5 G/DL
ALP SERPL-CCNC: 104 U/L (ref 39–117)
ALT SERPL W P-5'-P-CCNC: 30 U/L (ref 1–33)
ANION GAP SERPL CALCULATED.3IONS-SCNC: 12.4 MMOL/L (ref 5–15)
AST SERPL-CCNC: 27 U/L (ref 1–32)
BASOPHILS # BLD AUTO: 0.02 10*3/MM3 (ref 0–0.2)
BASOPHILS NFR BLD AUTO: 0.5 % (ref 0–1.5)
BILIRUB SERPL-MCNC: 0.3 MG/DL (ref 0–1.2)
BUN SERPL-MCNC: 12 MG/DL (ref 8–23)
BUN/CREAT SERPL: 17.4 (ref 7–25)
CALCIUM SPEC-SCNC: 9.7 MG/DL (ref 8.6–10.5)
CHLORIDE SERPL-SCNC: 99 MMOL/L (ref 98–107)
CO2 SERPL-SCNC: 26.6 MMOL/L (ref 22–29)
CREAT SERPL-MCNC: 0.69 MG/DL (ref 0.57–1)
DEPRECATED RDW RBC AUTO: 39.2 FL (ref 37–54)
EGFRCR SERPLBLD CKD-EPI 2021: 96.5 ML/MIN/1.73
EOSINOPHIL # BLD AUTO: 0.11 10*3/MM3 (ref 0–0.4)
EOSINOPHIL NFR BLD AUTO: 2.5 % (ref 0.3–6.2)
ERYTHROCYTE [DISTWIDTH] IN BLOOD BY AUTOMATED COUNT: 12.2 % (ref 12.3–15.4)
EXPIRATION DATE: NORMAL
EXPIRATION DATE: NORMAL
FLUAV AG NPH QL: NEGATIVE
FLUBV AG NPH QL: NEGATIVE
GLOBULIN UR ELPH-MCNC: 3 GM/DL
GLUCOSE SERPL-MCNC: 97 MG/DL (ref 65–99)
HCT VFR BLD AUTO: 39.2 % (ref 34–46.6)
HGB BLD-MCNC: 13.3 G/DL (ref 12–15.9)
IMM GRANULOCYTES # BLD AUTO: 0.01 10*3/MM3 (ref 0–0.05)
IMM GRANULOCYTES NFR BLD AUTO: 0.2 % (ref 0–0.5)
INTERNAL CONTROL: NORMAL
INTERNAL CONTROL: NORMAL
LYMPHOCYTES # BLD AUTO: 0.84 10*3/MM3 (ref 0.7–3.1)
LYMPHOCYTES NFR BLD AUTO: 19.2 % (ref 19.6–45.3)
Lab: NORMAL
Lab: NORMAL
MCH RBC QN AUTO: 29.7 PG (ref 26.6–33)
MCHC RBC AUTO-ENTMCNC: 33.9 G/DL (ref 31.5–35.7)
MCV RBC AUTO: 87.5 FL (ref 79–97)
MONOCYTES # BLD AUTO: 0.58 10*3/MM3 (ref 0.1–0.9)
MONOCYTES NFR BLD AUTO: 13.2 % (ref 5–12)
NEUTROPHILS NFR BLD AUTO: 2.82 10*3/MM3 (ref 1.7–7)
NEUTROPHILS NFR BLD AUTO: 64.4 % (ref 42.7–76)
NRBC BLD AUTO-RTO: 0 /100 WBC (ref 0–0.2)
PLATELET # BLD AUTO: 285 10*3/MM3 (ref 140–450)
PMV BLD AUTO: 10 FL (ref 6–12)
POTASSIUM SERPL-SCNC: 3.6 MMOL/L (ref 3.5–5.2)
PROT SERPL-MCNC: 7.4 G/DL (ref 6–8.5)
RBC # BLD AUTO: 4.48 10*6/MM3 (ref 3.77–5.28)
SARS-COV-2 AG UPPER RESP QL IA.RAPID: NOT DETECTED
SARS-COV-2 RNA RESP QL NAA+PROBE: NOT DETECTED
SODIUM SERPL-SCNC: 138 MMOL/L (ref 136–145)
WBC NRBC COR # BLD: 4.38 10*3/MM3 (ref 3.4–10.8)

## 2022-03-04 PROCEDURE — 87804 INFLUENZA ASSAY W/OPTIC: CPT | Performed by: STUDENT IN AN ORGANIZED HEALTH CARE EDUCATION/TRAINING PROGRAM

## 2022-03-04 PROCEDURE — 85025 COMPLETE CBC W/AUTO DIFF WBC: CPT | Performed by: STUDENT IN AN ORGANIZED HEALTH CARE EDUCATION/TRAINING PROGRAM

## 2022-03-04 PROCEDURE — 80053 COMPREHEN METABOLIC PANEL: CPT | Performed by: STUDENT IN AN ORGANIZED HEALTH CARE EDUCATION/TRAINING PROGRAM

## 2022-03-04 PROCEDURE — 99213 OFFICE O/P EST LOW 20 MIN: CPT | Performed by: STUDENT IN AN ORGANIZED HEALTH CARE EDUCATION/TRAINING PROGRAM

## 2022-03-04 PROCEDURE — U0003 INFECTIOUS AGENT DETECTION BY NUCLEIC ACID (DNA OR RNA); SEVERE ACUTE RESPIRATORY SYNDROME CORONAVIRUS 2 (SARS-COV-2) (CORONAVIRUS DISEASE [COVID-19]), AMPLIFIED PROBE TECHNIQUE, MAKING USE OF HIGH THROUGHPUT TECHNOLOGIES AS DESCRIBED BY CMS-2020-01-R: HCPCS | Performed by: STUDENT IN AN ORGANIZED HEALTH CARE EDUCATION/TRAINING PROGRAM

## 2022-03-04 PROCEDURE — 87426 SARSCOV CORONAVIRUS AG IA: CPT | Performed by: STUDENT IN AN ORGANIZED HEALTH CARE EDUCATION/TRAINING PROGRAM

## 2022-03-04 PROCEDURE — U0005 INFEC AGEN DETEC AMPLI PROBE: HCPCS | Performed by: STUDENT IN AN ORGANIZED HEALTH CARE EDUCATION/TRAINING PROGRAM

## 2022-03-04 PROCEDURE — 36415 COLL VENOUS BLD VENIPUNCTURE: CPT | Performed by: STUDENT IN AN ORGANIZED HEALTH CARE EDUCATION/TRAINING PROGRAM

## 2022-03-04 RX ORDER — AMOXICILLIN 875 MG/1
875 TABLET, COATED ORAL 2 TIMES DAILY
Qty: 10 TABLET | Refills: 0 | Status: SHIPPED | OUTPATIENT
Start: 2022-03-04 | End: 2022-03-09

## 2022-03-04 RX ORDER — DICYCLOMINE HYDROCHLORIDE 10 MG/1
10 CAPSULE ORAL
Qty: 30 CAPSULE | Refills: 0 | Status: SHIPPED | OUTPATIENT
Start: 2022-03-04 | End: 2022-03-14

## 2022-03-04 NOTE — PROGRESS NOTES
"Chief Complaint  Follow-up (from previous visit), Sinusitis, Back Pain, and abdominal pain (discomfort)    Subjective            Stormy Hoffman presents to [facility]  History of Present Illness      Sinus issues:  Ongoing for 6-8wks.   Taking aleve cold and sinus which typically helps. Using flonase as well.   \"haven't cough that much\"  Rapid covid test at home were negative.   No fever but \"does not typically run fever, just not something my body does\"    Monday night, came down with stomach virus and experienced diarrhea on and off since then.   States she has been very tender over middle of abdomen. States her stomach is still bothering her and she finds herself having to be careful with what she eats.     Endorses diarrhea as often as 6x per day.   Started collagen pill last week, wonders if this could be contributing.   Lower abomdinal pain/pressure like, with relief of symptoms with belching.   Denies urinary symptoms.     Endorses indigestion as well.     Endorses back pain as well. Back pain is chronic.    Felt wiped out, almost like I might have had the flu.     Did not get flu vaccine this year but is fully vaccinated against covid.     Retired 6 weeks ago.       Past Medical History:   Diagnosis Date   • Allergies    • Anxiety    • Arthritis    • Asymptomatic microscopic hematuria 08/07/2017   • Diarrhea 11/24/2020    chronic and worsening, exact etiology is unclear at this time. worrisome features include urgency and pt having to wake upa t night to defecate. Lack of abdominal pain is reassuring. Will obtain labs and stool studies for fruther evaluation. We did discuss potential need for GI referral for colonoscopy with biopsy if symptoms persist and lab studies are unreavealing. Discussed red flag symptoms th   • Edema    • Fatigue 07/29/2019   • Hematuria    • Hyperlipidemia    • Kidney stone    • Osteoarthritis, hand, primary localized, unspecified " laterality 2018   • Primary osteoarthritis of right knee 2018   • Restless leg syndrome    • Seasonal allergies    • Sinus trouble        Allergies:   Allergies   Allergen Reactions   • Nitrofurantoin Anaphylaxis   • Lisinopril Cough          Past Surgical History:   Procedure Laterality Date   • TUBAL ABDOMINAL LIGATION  1990          Social History     Socioeconomic History   • Marital status:    Tobacco Use   • Smoking status: Former Smoker     Packs/day: 1.00     Years: 3.00     Pack years: 3.00     Start date:      Quit date:      Years since quittin.2   • Smokeless tobacco: Never Used   Vaping Use   • Vaping Use: Never used   Substance and Sexual Activity   • Alcohol use: Yes     Comment: light 3/5/19 occasionally drinks   • Drug use: Never         Family History   Problem Relation Age of Onset   • Stroke Mother    • Arthritis Mother    • Osteoporosis Mother    • Diabetes Father    • Arthritis Sister    • Osteoporosis Sister    • Arthritis Brother           Health Maintenance Due   Topic Date Due   • DXA SCAN  Never done   • TDAP/TD VACCINES (1 - Tdap) Never done   • ZOSTER VACCINE (2 of 2) 2020   • Pneumococcal Vaccine 65+ (1 of 1 - PPSV23) Never done   • HEPATITIS C SCREENING  Never done   • ANNUAL WELLNESS VISIT  Never done   • LIPID PANEL  06/10/2021   • INFLUENZA VACCINE  2021   • MAMMOGRAM  2021            Current Outpatient Medications:   •  busPIRone (BUSPAR) 15 MG tablet, Take 0.5 tablets by mouth 2 (Two) Times a Day for 90 days. Take 1/2 tablet by mouth twice daily, Disp: 90 tablet, Rfl: 1  •  calcium carbonate, oyster shell, 500 MG tablet tablet, TAKE ONE TABLET BY MOUTH EVERY DAY, Disp: 90 tablet, Rfl: 1  •  fexofenadine (ALLEGRA) 180 MG tablet, Take 1 tablet by mouth Daily for 90 days., Disp: 90 tablet, Rfl: 1  •  fluticasone (Flonase) 50 MCG/ACT nasal spray, 2 sprays into the nostril(s) as directed by provider Daily for 30 days., Disp:  "18.2 mL, Rfl: 3  •  lisinopril (PRINIVIL,ZESTRIL) 10 MG tablet, Take 1 tablet by mouth Daily for 90 days., Disp: 90 tablet, Rfl: 1  •  montelukast (Singulair) 10 MG tablet, Take 1 tablet by mouth Every Night for 90 days., Disp: 90 tablet, Rfl: 1  •  pramipexole (MIRAPEX) 0.5 MG tablet, Take 1 tablet by mouth Daily for 90 days., Disp: 90 tablet, Rfl: 1  •  tolterodine (DETROL) 1 MG tablet, Take 1 tablet by mouth 2 (Two) Times a Day., Disp: 180 tablet, Rfl: 1      Immunization History   Administered Date(s) Administered   • COVID-19 (PFIZER) PURPLE CAP 03/17/2021, 04/07/2021, 12/16/2021   • Flu Vaccine Quad PF >36MO 12/20/2019, 10/27/2020   • Influenza, Unspecified 12/20/2019   • Shingrix 10/27/2020         Review of Systems   Per hpi     Objective       Vitals:    03/04/22 1027   BP: 142/70   BP Location: Right arm   Patient Position: Sitting   Pulse: 88   Resp: 18   Temp: 98 °F (36.7 °C)   TempSrc: Oral   SpO2: 99%   Weight: 79.9 kg (176 lb 3.2 oz)   Height: 157.5 cm (62.01\")     Body mass index is 32.22 kg/m².      Physical Exam  Vitals reviewed.   Constitutional:       Appearance: Normal appearance. She is well-developed.   HENT:      Head: Normocephalic and atraumatic.      Right Ear: External ear normal.      Left Ear: External ear normal.      Nose:      Right Sinus: Maxillary sinus tenderness present.      Left Sinus: Maxillary sinus tenderness present.      Mouth/Throat:      Pharynx: No oropharyngeal exudate.   Eyes:      Conjunctiva/sclera: Conjunctivae normal.      Pupils: Pupils are equal, round, and reactive to light.   Cardiovascular:      Rate and Rhythm: Normal rate and regular rhythm.      Pulses: Normal pulses.      Heart sounds: No murmur heard.    No friction rub. No gallop.   Pulmonary:      Effort: Pulmonary effort is normal.      Breath sounds: Normal breath sounds. No wheezing or rhonchi.   Abdominal:      General: Bowel sounds are normal. There is no distension.      Palpations: Abdomen is " soft.      Tenderness: There is no abdominal tenderness.   Skin:     General: Skin is warm and dry.      Findings: No rash.   Neurological:      Mental Status: She is alert and oriented to person, place, and time.      Cranial Nerves: No cranial nerve deficit.   Psychiatric:         Mood and Affect: Mood and affect normal.         Behavior: Behavior normal.         Thought Content: Thought content normal.         Judgment: Judgment normal.             Result Review :                           Assessment and Plan      Diagnoses and all orders for this visit:    1. Sinusitis, unspecified chronicity, unspecified location (Primary)  Comments:  Chronic, recurrent with active flare. Amoxicillin sent in. Given active covid pandemic, poct covid screen obtained and negative.   Orders:  -     amoxicillin (AMOXIL) 875 MG tablet; Take 1 tablet by mouth 2 (Two) Times a Day for 5 days.  Dispense: 10 tablet; Refill: 0  -     COVID-19,CEPHEID/LENORE,COR/SANDI/PAD/XIOMARA IN-HOUSE(OR EMERGENT/ADD-ON),NP SWAB IN TRANSPORT MEDIA 3-4 HR TAT, RT-PCR - Swab, Nasopharynx; Future  -     COVID-19,CEPHEID/LENORE,COR/SANDI/PAD/XIOMARA IN-HOUSE(OR EMERGENT/ADD-ON),NP SWAB IN TRANSPORT MEDIA 3-4 HR TAT, RT-PCR - Swab, Nasopharynx    2. Diarrhea, unspecified type  Comments:  New and acute. Screening for covid given URI symptoms with diarrhea. POC negative. PCR testing sent in. Will trial bentyl.  Orders:  -     Comprehensive metabolic panel; Future  -     CBC w AUTO Differential; Future  -     COVID-19,CEPHEID/LENORE,COR/SANDI/PAD/XIOMARA IN-HOUSE(OR EMERGENT/ADD-ON),NP SWAB IN TRANSPORT MEDIA 3-4 HR TAT, RT-PCR - Swab, Nasopharynx; Future  -     Comprehensive metabolic panel  -     CBC w AUTO Differential  -     COVID-19,CEPHEID/LENORE,COR/SANDI/PAD/XIOMARA IN-HOUSE(OR EMERGENT/ADD-ON),NP SWAB IN TRANSPORT MEDIA 3-4 HR TAT, RT-PCR - Swab, Nasopharynx    3. Abdominal pain, unspecified abdominal location  Comments:  Acute and non-specific. Exam is unremarkable. Staring bentyl  prn.   Orders:  -     dicyclomine (Bentyl) 10 MG capsule; Take 1 capsule by mouth 3 (Three) Times a Day With Meals for 10 days.  Dispense: 30 capsule; Refill: 0    4. Congestion of nasal sinus  Comments:  Acute, secondary to active sinusitis. Tx per above.   Orders:  -     POCT SARS-CoV-2 Antigen [facility]ENCE  -     POCT Influenza A/B  -     COVID-19,CEPHEID/LENORE,COR/SANDI/PAD/XIOMARA IN-HOUSE(OR EMERGENT/ADD-ON),NP SWAB IN TRANSPORT MEDIA 3-4 HR TAT, RT-PCR - Swab, Nasopharynx; Future  -     COVID-19,CEPHEID/LENORE,COR/SANDI/PAD/IXOMARA IN-HOUSE(OR EMERGENT/ADD-ON),NP SWAB IN TRANSPORT MEDIA 3-4 HR TAT, RT-PCR - Swab, Nasopharynx    5. Myalgia  Comments:  Acute, given uri symptoms with concern for flu, poct testing for flu was negative. Monitor clinically   Orders:  -     POCT SARS-CoV-2 Antigen CADENCE  -     POCT Influenza A/B            Follow Up     No follow-ups on file.    Patient was given instructions and counseling regarding her condition or for health maintenance advice. Please see specific information pulled into the AVS if appropriate.     Tressa Schulte MD   Internal Medicine-Pediatrics

## 2022-07-11 ENCOUNTER — TELEPHONE (OUTPATIENT)
Dept: INTERNAL MEDICINE | Facility: CLINIC | Age: 67
End: 2022-07-11
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
## 2022-07-12 ENCOUNTER — OFFICE VISIT (OUTPATIENT)
Dept: INTERNAL MEDICINE | Facility: CLINIC | Age: 67
End: 2022-07-12

## 2022-07-12 VITALS
TEMPERATURE: 98.3 F | OXYGEN SATURATION: 96 % | HEART RATE: 95 BPM | DIASTOLIC BLOOD PRESSURE: 78 MMHG | HEIGHT: 62 IN | SYSTOLIC BLOOD PRESSURE: 136 MMHG | BODY MASS INDEX: 32.94 KG/M2 | WEIGHT: 179 LBS

## 2022-07-12 DIAGNOSIS — M54.6 ACUTE LEFT-SIDED THORACIC BACK PAIN: ICD-10-CM
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
## 2022-07-12 DIAGNOSIS — R93.89 ABNORMAL X-RAY: Primary | ICD-10-CM

## 2022-07-12 DIAGNOSIS — M54.2 NECK PAIN: Primary | ICD-10-CM

## 2022-07-12 PROCEDURE — 99213 OFFICE O/P EST LOW 20 MIN: CPT | Performed by: PHYSICIAN ASSISTANT

## 2022-07-12 RX ORDER — IBUPROFEN 600 MG/1
600 TABLET ORAL EVERY 6 HOURS PRN
Qty: 30 TABLET | Refills: 0 | Status: SHIPPED | OUTPATIENT
Start: 2022-07-12 | End: 2022-08-03 | Stop reason: SDUPTHER

## 2022-07-12 RX ORDER — CYCLOBENZAPRINE HCL 5 MG
5 TABLET ORAL 3 TIMES DAILY PRN
Qty: 30 TABLET | Refills: 0 | Status: SHIPPED | OUTPATIENT
Start: 2022-07-12 | End: 2022-08-03 | Stop reason: SDUPTHER

## 2022-07-12 NOTE — ASSESSMENT & PLAN NOTE
Likely MSK origin, possible muscle strain.  Will get xray of cervical and thoracic spine to rule out acute injury.  Will send in ibuprofen and cyclobenzaprine as well.  Discussed with patient if her symptoms persist will schedule physical therapy.  If symptoms seem to be worsening may need to order MRI.  Call for any questions or concerns.
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
## 2022-07-13 ENCOUNTER — CLINICAL SUPPORT (OUTPATIENT)
Dept: INTERNAL MEDICINE | Facility: CLINIC | Age: 67
End: 2022-07-13

## 2022-07-15 ENCOUNTER — TELEPHONE (OUTPATIENT)
Dept: INTERNAL MEDICINE | Facility: CLINIC | Age: 67
End: 2022-07-15

## 2022-07-15 NOTE — TELEPHONE ENCOUNTER
Called and informed of XRAY results per provider.   Patient verbalized Understanding, had no follow up questions or concerns at this time.
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
## 2022-07-19 ENCOUNTER — TELEPHONE (OUTPATIENT)
Dept: INTERNAL MEDICINE | Facility: CLINIC | Age: 67
End: 2022-07-19

## 2022-07-19 NOTE — TELEPHONE ENCOUNTER
Caller: Stormy Hoffman    Relationship: Self    Best call back number: [phone]    What orders are you requesting (i.e. lab or imaging):IMAGING CT SCAN      In what timeframe would the patient need to come in: ASA     Where will you receive your lab/imaging services: [facility]     Additional notes:  SOMEONE WAS SUPPOSE TO CONTACT THE PATIENT AND SCHEDULE HER FOR A CT SCAN  AND PATIENT STATES THAT NO ONE HAS EVER CALLED HER. STATES THAT SHE HAD X RAYS AND WAS INFORMED THAT A CT WAS NEEDED.

## 2022-07-20 ENCOUNTER — TRANSCRIBE ORDERS (OUTPATIENT)
Dept: ADMINISTRATIVE | Facility: HOSPITAL | Age: 67
End: 2022-07-20

## 2022-07-21 NOTE — TELEPHONE ENCOUNTER
Spoke with [facility].    They are not sure why the pt has not been called to schedule.  They do not see anything wrong with the order.    They will get a  to call the pt and get her scheduled.

## 2022-07-21 NOTE — TELEPHONE ENCOUNTER
Red rule verified and correct.    PT CALLING BACK, STATED THERE WAS AN ISSUE WITH THE CT ORDER.    WILL CALL [facility] AND INQUIRE.

## 2022-07-21 NOTE — TELEPHONE ENCOUNTER
Red rule verified and correct.    Pt calling back.  Advised that scheduling should be calling her.    Went ahead and transferred to centralized scheduling.

## 2022-07-22 ENCOUNTER — TELEPHONE (OUTPATIENT)
Dept: INTERNAL MEDICINE | Facility: CLINIC | Age: 67
End: 2022-07-22

## 2022-07-22 DIAGNOSIS — R59.1 LYMPHADENOPATHY: Primary | ICD-10-CM

## 2022-07-22 NOTE — TELEPHONE ENCOUNTER
Caller: Stormy Hoffman    Relationship: Self    Best call back number: [phone]    Who are you requesting to speak with (clinical staff, provider,  specific staff member): MEDICAL STAFF    What was the call regarding: PATIENT WOULD LIKE TO SPEAK WITH THE MEDICAL STAFF TO FIGURE OUT THE REFERRAL FOR HER CT SCAN. ATTEMPTED TO WARM TRANSFER. PLEASE CALL PATIENT TO ADVISE.

## 2022-07-25 ENCOUNTER — HOSPITAL ENCOUNTER (OUTPATIENT)
Dept: CT IMAGING | Facility: HOSPITAL | Age: 67
Discharge: HOME | End: 2022-07-25
Admitting: NURSE PRACTITIONER

## 2022-07-25 LAB
CREAT BLDA-MCNC: 0.6 MG/DL
EGFRCR SERPLBLD CKD-EPI 2021: 99.1 ML/MIN/1.73

## 2022-07-25 PROCEDURE — 82565 ASSAY OF CREATININE: CPT

## 2022-07-25 PROCEDURE — 71260 CT THORAX DX C+: CPT

## 2022-07-25 PROCEDURE — 0 IOPAMIDOL PER 1 ML: Performed by: NURSE PRACTITIONER

## 2022-07-25 RX ADMIN — IOPAMIDOL 100 ML: 755 INJECTION, SOLUTION INTRAVENOUS at 11:40

## 2022-08-03 ENCOUNTER — TELEPHONE (OUTPATIENT)
Dept: INTERNAL MEDICINE | Facility: CLINIC | Age: 67
End: 2022-08-03

## 2022-08-03 DIAGNOSIS — G25.81 RESTLESS LEG SYNDROME: ICD-10-CM

## 2022-08-03 DIAGNOSIS — F41.9 ANXIETY: ICD-10-CM

## 2022-08-03 RX ORDER — PRAMIPEXOLE DIHYDROCHLORIDE 0.5 MG/1
0.5 TABLET ORAL DAILY
Qty: 90 TABLET | Refills: 1 | Status: SHIPPED | OUTPATIENT
Start: 2022-08-03 | End: 2023-01-26 | Stop reason: SDUPTHER

## 2022-08-03 RX ORDER — CYCLOBENZAPRINE HCL 5 MG
5 TABLET ORAL 3 TIMES DAILY PRN
Qty: 30 TABLET | Refills: 0 | Status: SHIPPED | OUTPATIENT
Start: 2022-08-03

## 2022-08-03 RX ORDER — IBUPROFEN 600 MG/1
600 TABLET ORAL EVERY 6 HOURS PRN
Qty: 30 TABLET | Refills: 0 | Status: SHIPPED | OUTPATIENT
Start: 2022-08-03

## 2022-08-03 RX ORDER — BUSPIRONE HYDROCHLORIDE 15 MG/1
7.5 TABLET ORAL 2 TIMES DAILY
Qty: 90 TABLET | Refills: 1 | Status: SHIPPED | OUTPATIENT
Start: 2022-08-03 | End: 2023-02-21 | Stop reason: SDUPTHER

## 2022-08-03 NOTE — TELEPHONE ENCOUNTER
Called patient. No answer; left VM for patient to call the office back on behalf of her CT scan that she had concerns about.

## 2022-08-16 ENCOUNTER — CLINICAL SUPPORT (OUTPATIENT)
Dept: INTERNAL MEDICINE | Facility: CLINIC | Age: 67
End: 2022-08-16

## 2022-08-16 ENCOUNTER — DOCUMENTATION (OUTPATIENT)
Dept: INTERNAL MEDICINE | Facility: CLINIC | Age: 67
End: 2022-08-16

## 2022-08-16 DIAGNOSIS — N39.0 URINARY TRACT INFECTION WITH HEMATURIA, SITE UNSPECIFIED: Primary | ICD-10-CM

## 2022-08-16 DIAGNOSIS — R82.90 ABNORMAL URINALYSIS: Primary | ICD-10-CM

## 2022-08-16 DIAGNOSIS — R31.9 URINARY TRACT INFECTION WITH HEMATURIA, SITE UNSPECIFIED: Primary | ICD-10-CM

## 2022-08-16 LAB
BACTERIA UR QL AUTO: ABNORMAL /HPF
BILIRUB UR QL STRIP: NEGATIVE
CLARITY UR: ABNORMAL
COLOR UR: YELLOW
GLUCOSE UR STRIP-MCNC: NEGATIVE MG/DL
HGB UR QL STRIP.AUTO: ABNORMAL
HYALINE CASTS UR QL AUTO: ABNORMAL /LPF
KETONES UR QL STRIP: NEGATIVE
LEUKOCYTE ESTERASE UR QL STRIP.AUTO: ABNORMAL
NITRITE UR QL STRIP: NEGATIVE
PH UR STRIP.AUTO: 5.5 [PH] (ref 5–8)
PROT UR QL STRIP: ABNORMAL
RBC # UR STRIP: ABNORMAL /HPF
REF LAB TEST METHOD: ABNORMAL
SP GR UR STRIP: 1.01 (ref 1–1.03)
SQUAMOUS #/AREA URNS HPF: ABNORMAL /HPF
UROBILINOGEN UR QL STRIP: ABNORMAL
WBC # UR STRIP: ABNORMAL /HPF

## 2022-08-16 PROCEDURE — 81001 URINALYSIS AUTO W/SCOPE: CPT | Performed by: STUDENT IN AN ORGANIZED HEALTH CARE EDUCATION/TRAINING PROGRAM

## 2022-08-16 PROCEDURE — 87086 URINE CULTURE/COLONY COUNT: CPT | Performed by: STUDENT IN AN ORGANIZED HEALTH CARE EDUCATION/TRAINING PROGRAM

## 2022-08-16 PROCEDURE — 81003 URINALYSIS AUTO W/O SCOPE: CPT | Performed by: STUDENT IN AN ORGANIZED HEALTH CARE EDUCATION/TRAINING PROGRAM

## 2022-08-16 RX ORDER — PHENAZOPYRIDINE HYDROCHLORIDE 200 MG/1
200 TABLET, FILM COATED ORAL 3 TIMES DAILY PRN
Qty: 6 TABLET | Refills: 0 | Status: SHIPPED | OUTPATIENT
Start: 2022-08-16 | End: 2022-08-18

## 2022-08-16 RX ORDER — SULFAMETHOXAZOLE AND TRIMETHOPRIM 800; 160 MG/1; MG/1
1 TABLET ORAL 2 TIMES DAILY
Qty: 6 TABLET | Refills: 0 | Status: SHIPPED | OUTPATIENT
Start: 2022-08-16 | End: 2022-08-19

## 2022-08-16 NOTE — PROGRESS NOTES
UTI Protocol       1.) Stormy Hoffman, 1955, presents to the clinic with Frequency, Flank Pain and Hematuria and odorous urine    2.) Duration: 1 week    3.) History of Frequent UTIs? yes    4.) History of yeast infections with antibiotics? yes    5.) Any use of OTC medications to treat UTI symptoms such as Azo? yes    6.) Any allergies to antibiotics? yes    Allergies   Allergen Reactions   • Nitrofurantoin Anaphylaxis   • Lisinopril Cough       7.) Antibiotic use in the last 3 months? no    If yes what antibiotic?       Provider Consulted:     Provider Instructions: Increase fluid intake, send urine for culture and  plans to call in antibiotics for patient to take      Luis Gipson RN  08/16/22, 15:18 EDT

## 2022-08-17 LAB — BACTERIA SPEC AEROBE CULT: ABNORMAL

## 2022-10-10 DIAGNOSIS — M19.90 UNSPECIFIED OSTEOARTHRITIS, UNSPECIFIED SITE: ICD-10-CM

## 2022-10-11 RX ORDER — CALCIUM CARBONATE 500(1250)
TABLET ORAL
Qty: 90 TABLET | Refills: 1 | Status: SHIPPED | OUTPATIENT
Start: 2022-10-11

## 2022-10-17 ENCOUNTER — TELEPHONE (OUTPATIENT)
Dept: INTERNAL MEDICINE | Facility: CLINIC | Age: 67
End: 2022-10-17

## 2022-10-17 NOTE — TELEPHONE ENCOUNTER
Caller: Stormy Hoffman    Relationship: Self    Best call back number: [phone]    What medications are you currently taking:   Current Outpatient Medications on File Prior to Visit   Medication Sig Dispense Refill   • busPIRone (BUSPAR) 15 MG tablet Take 0.5 tablets by mouth 2 (Two) Times a Day for 90 days. Take 1/2 tablet by mouth twice daily 90 tablet 1   • calcium carbonate, oyster shell, 500 MG tablet tablet TAKE ONE TABLET BY MOUTH EVERY DAY 90 tablet 1   • cyclobenzaprine (FLEXERIL) 5 MG tablet Take 1 tablet by mouth 3 (Three) Times a Day As Needed for Muscle Spasms. 30 tablet 0   • fexofenadine (ALLEGRA) 180 MG tablet Take 1 tablet by mouth Daily for 90 days. 90 tablet 1   • fluticasone (Flonase) 50 MCG/ACT nasal spray 2 sprays into the nostril(s) as directed by provider Daily for 30 days. 18.2 mL 3   • ibuprofen (ADVIL,MOTRIN) 600 MG tablet Take 1 tablet by mouth Every 6 (Six) Hours As Needed for Moderate Pain . 30 tablet 0   • pramipexole (MIRAPEX) 0.5 MG tablet Take 1 tablet by mouth Daily for 90 days. 90 tablet 1     No current facility-administered medications on file prior to visit.          When did you start taking these medications: 8 YEARS     Which medication are you concerned about: busPIRone (BUSPAR) 15 MG tablet    Who prescribed you this medication:PRIO PROVIDER, THEN Tressa Schulte MD     What are your concerns: PATIENT STATES MEDICATION IS NO LONGER WORKING, AND POSSIBLE NEED SOMETHING, CHANGE OR STRONGER.     How long have you had these concerns: LAST COUPLE MONTHS       HUB WAS TO MAKE A APPOINTMENT WITH PCP, NEXT AVAILABLE, 11/08/2022 , WITH PCP, IN OFFICE 10/31/2022.

## 2022-10-21 NOTE — TELEPHONE ENCOUNTER
Patient states the Buspar 15mg is no longer helping her. She is wanting something different or stronger.    LOV:7/12/2022

## 2022-10-28 ENCOUNTER — OFFICE VISIT (OUTPATIENT)
Dept: INTERNAL MEDICINE | Facility: CLINIC | Age: 67
End: 2022-10-28

## 2022-10-28 VITALS
OXYGEN SATURATION: 97 % | TEMPERATURE: 98.1 F | BODY MASS INDEX: 32.57 KG/M2 | HEART RATE: 92 BPM | HEIGHT: 62 IN | SYSTOLIC BLOOD PRESSURE: 128 MMHG | DIASTOLIC BLOOD PRESSURE: 72 MMHG | WEIGHT: 177 LBS

## 2022-10-28 DIAGNOSIS — Z23 INFLUENZA VACCINE NEEDED: ICD-10-CM

## 2022-10-28 DIAGNOSIS — D64.9 ANEMIA, UNSPECIFIED TYPE: ICD-10-CM

## 2022-10-28 DIAGNOSIS — E78.5 HYPERLIPIDEMIA, UNSPECIFIED HYPERLIPIDEMIA TYPE: ICD-10-CM

## 2022-10-28 DIAGNOSIS — Z23 NEED FOR PNEUMOCOCCAL VACCINE: ICD-10-CM

## 2022-10-28 DIAGNOSIS — Z11.59 ENCOUNTER FOR HEPATITIS C SCREENING TEST FOR LOW RISK PATIENT: ICD-10-CM

## 2022-10-28 DIAGNOSIS — F41.9 ANXIETY: ICD-10-CM

## 2022-10-28 DIAGNOSIS — Z00.00 MEDICARE ANNUAL WELLNESS VISIT, SUBSEQUENT: Primary | ICD-10-CM

## 2022-10-28 DIAGNOSIS — Z12.31 BREAST CANCER SCREENING BY MAMMOGRAM: ICD-10-CM

## 2022-10-28 PROCEDURE — 1170F FXNL STATUS ASSESSED: CPT | Performed by: STUDENT IN AN ORGANIZED HEALTH CARE EDUCATION/TRAINING PROGRAM

## 2022-10-28 PROCEDURE — G0402 INITIAL PREVENTIVE EXAM: HCPCS | Performed by: STUDENT IN AN ORGANIZED HEALTH CARE EDUCATION/TRAINING PROGRAM

## 2022-10-28 PROCEDURE — 90662 IIV NO PRSV INCREASED AG IM: CPT | Performed by: STUDENT IN AN ORGANIZED HEALTH CARE EDUCATION/TRAINING PROGRAM

## 2022-10-28 PROCEDURE — 99214 OFFICE O/P EST MOD 30 MIN: CPT | Performed by: STUDENT IN AN ORGANIZED HEALTH CARE EDUCATION/TRAINING PROGRAM

## 2022-10-28 PROCEDURE — G0008 ADMIN INFLUENZA VIRUS VAC: HCPCS | Performed by: STUDENT IN AN ORGANIZED HEALTH CARE EDUCATION/TRAINING PROGRAM

## 2022-10-28 PROCEDURE — 1159F MED LIST DOCD IN RCRD: CPT | Performed by: STUDENT IN AN ORGANIZED HEALTH CARE EDUCATION/TRAINING PROGRAM

## 2022-10-28 RX ORDER — FLUOXETINE HYDROCHLORIDE 20 MG/1
20 CAPSULE ORAL DAILY
Qty: 30 CAPSULE | Refills: 1 | Status: SHIPPED | OUTPATIENT
Start: 2022-10-28 | End: 2023-01-04 | Stop reason: SDUPTHER

## 2022-10-28 RX ORDER — CLINDAMYCIN PHOSPHATE 11.9 MG/ML
SOLUTION TOPICAL
COMMUNITY
Start: 2022-09-30
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
## 2022-11-04 DIAGNOSIS — Z12.31 BREAST CANCER SCREENING BY MAMMOGRAM: Primary | ICD-10-CM

## 2022-11-23 ENCOUNTER — CLINICAL SUPPORT (OUTPATIENT)
Dept: INTERNAL MEDICINE | Facility: CLINIC | Age: 67
End: 2022-11-23

## 2022-11-23 LAB
ALBUMIN SERPL-MCNC: 4.2 G/DL (ref 3.5–5.2)
ALBUMIN/GLOB SERPL: 1.6 G/DL
ALP SERPL-CCNC: 86 U/L (ref 39–117)
ALT SERPL W P-5'-P-CCNC: 20 U/L (ref 1–33)
ANION GAP SERPL CALCULATED.3IONS-SCNC: 10 MMOL/L (ref 5–15)
AST SERPL-CCNC: 22 U/L (ref 1–32)
BASOPHILS # BLD AUTO: 0.08 10*3/MM3 (ref 0–0.2)
BASOPHILS NFR BLD AUTO: 1.4 % (ref 0–1.5)
BILIRUB SERPL-MCNC: 0.4 MG/DL (ref 0–1.2)
BUN SERPL-MCNC: 13 MG/DL (ref 8–23)
BUN/CREAT SERPL: 18.3 (ref 7–25)
CALCIUM SPEC-SCNC: 9.4 MG/DL (ref 8.6–10.5)
CHLORIDE SERPL-SCNC: 102 MMOL/L (ref 98–107)
CHOLEST SERPL-MCNC: 247 MG/DL (ref 0–200)
CO2 SERPL-SCNC: 27 MMOL/L (ref 22–29)
CREAT SERPL-MCNC: 0.71 MG/DL (ref 0.57–1)
DEPRECATED RDW RBC AUTO: 40.5 FL (ref 37–54)
EGFRCR SERPLBLD CKD-EPI 2021: 93.9 ML/MIN/1.73
EOSINOPHIL # BLD AUTO: 0.2 10*3/MM3 (ref 0–0.4)
EOSINOPHIL NFR BLD AUTO: 3.5 % (ref 0.3–6.2)
ERYTHROCYTE [DISTWIDTH] IN BLOOD BY AUTOMATED COUNT: 12.2 % (ref 12.3–15.4)
GLOBULIN UR ELPH-MCNC: 2.6 GM/DL
GLUCOSE SERPL-MCNC: 82 MG/DL (ref 65–99)
HCT VFR BLD AUTO: 36.6 % (ref 34–46.6)
HCV AB SER DONR QL: NORMAL
HDLC SERPL-MCNC: 58 MG/DL (ref 40–60)
HGB BLD-MCNC: 12.2 G/DL (ref 12–15.9)
IMM GRANULOCYTES # BLD AUTO: 0.02 10*3/MM3 (ref 0–0.05)
IMM GRANULOCYTES NFR BLD AUTO: 0.3 % (ref 0–0.5)
LDLC SERPL CALC-MCNC: 152 MG/DL (ref 0–100)
LDLC/HDLC SERPL: 2.55 {RATIO}
LYMPHOCYTES # BLD AUTO: 1.42 10*3/MM3 (ref 0.7–3.1)
LYMPHOCYTES NFR BLD AUTO: 24.6 % (ref 19.6–45.3)
MCH RBC QN AUTO: 30.2 PG (ref 26.6–33)
MCHC RBC AUTO-ENTMCNC: 33.3 G/DL (ref 31.5–35.7)
MCV RBC AUTO: 90.6 FL (ref 79–97)
MONOCYTES # BLD AUTO: 0.59 10*3/MM3 (ref 0.1–0.9)
MONOCYTES NFR BLD AUTO: 10.2 % (ref 5–12)
NEUTROPHILS NFR BLD AUTO: 3.46 10*3/MM3 (ref 1.7–7)
NEUTROPHILS NFR BLD AUTO: 60 % (ref 42.7–76)
NRBC BLD AUTO-RTO: 0 /100 WBC (ref 0–0.2)
PLATELET # BLD AUTO: 305 10*3/MM3 (ref 140–450)
PMV BLD AUTO: 10 FL (ref 6–12)
POTASSIUM SERPL-SCNC: 3.9 MMOL/L (ref 3.5–5.2)
PROT SERPL-MCNC: 6.8 G/DL (ref 6–8.5)
RBC # BLD AUTO: 4.04 10*6/MM3 (ref 3.77–5.28)
SODIUM SERPL-SCNC: 139 MMOL/L (ref 136–145)
TRIGL SERPL-MCNC: 206 MG/DL (ref 0–150)
TSH SERPL DL<=0.05 MIU/L-ACNC: 1.41 UIU/ML (ref 0.27–4.2)
VLDLC SERPL-MCNC: 37 MG/DL (ref 5–40)
WBC NRBC COR # BLD: 5.77 10*3/MM3 (ref 3.4–10.8)

## 2022-11-23 PROCEDURE — 84443 ASSAY THYROID STIM HORMONE: CPT | Performed by: STUDENT IN AN ORGANIZED HEALTH CARE EDUCATION/TRAINING PROGRAM

## 2022-11-23 PROCEDURE — 85025 COMPLETE CBC W/AUTO DIFF WBC: CPT | Performed by: STUDENT IN AN ORGANIZED HEALTH CARE EDUCATION/TRAINING PROGRAM

## 2022-11-23 PROCEDURE — 80053 COMPREHEN METABOLIC PANEL: CPT | Performed by: STUDENT IN AN ORGANIZED HEALTH CARE EDUCATION/TRAINING PROGRAM

## 2022-11-23 PROCEDURE — 80061 LIPID PANEL: CPT | Performed by: STUDENT IN AN ORGANIZED HEALTH CARE EDUCATION/TRAINING PROGRAM

## 2022-11-23 PROCEDURE — 86803 HEPATITIS C AB TEST: CPT | Performed by: STUDENT IN AN ORGANIZED HEALTH CARE EDUCATION/TRAINING PROGRAM

## 2022-11-30 DIAGNOSIS — E78.5 HYPERLIPIDEMIA, UNSPECIFIED HYPERLIPIDEMIA TYPE: Primary | ICD-10-CM

## 2023-01-04 DIAGNOSIS — F41.9 ANXIETY: ICD-10-CM

## 2023-01-04 RX ORDER — FLUOXETINE HYDROCHLORIDE 20 MG/1
20 CAPSULE ORAL DAILY
Qty: 30 CAPSULE | Refills: 1 | Status: SHIPPED | OUTPATIENT
Start: 2023-01-04 | End: 2023-03-20 | Stop reason: SDUPTHER

## 2023-01-04 NOTE — TELEPHONE ENCOUNTER
Caller: HoffmanStormy    Relationship: Self    Best call back number: [phone]     Requested Prescriptions:   Requested Prescriptions     Pending Prescriptions Disp Refills   • FLUoxetine (PROzac) 20 MG capsule 30 capsule 1     Sig: Take 1 capsule by mouth Daily for 30 days.        Pharmacy where request should be sent: [facility] [place] [address] [facility] [address] - [phone] [facility] [phone] FX     Additional details provided by patient: PATIENT STATES THIS MEDICATION IS WORKING REALLY WELL AND WOULD LIKE A REFILL    Does the patient have less than a 3 day supply:  [] Yes  [x] No    Would you like a call back once the refill request has been completed: [x] Yes [] No    If the office needs to give you a call back, can they leave a voicemail: [x] Yes [] No    Naeem Zepeda Rep   01/04/23 10:41 EST

## 2023-01-26 DIAGNOSIS — G25.81 RESTLESS LEG SYNDROME: ICD-10-CM

## 2023-01-26 RX ORDER — PRAMIPEXOLE DIHYDROCHLORIDE 0.5 MG/1
0.5 TABLET ORAL DAILY
Qty: 90 TABLET | Refills: 1 | Status: SHIPPED | OUTPATIENT
Start: 2023-01-26 | End: 2023-04-26

## 2023-02-03 ENCOUNTER — HOSPITAL ENCOUNTER (OUTPATIENT)
Dept: MAMMOGRAPHY | Facility: HOSPITAL | Age: 68
Discharge: HOME | End: 2023-02-03
Admitting: STUDENT IN AN ORGANIZED HEALTH CARE EDUCATION/TRAINING PROGRAM
Payer: MEDICARE

## 2023-02-03 DIAGNOSIS — Z12.31 BREAST CANCER SCREENING BY MAMMOGRAM: ICD-10-CM

## 2023-02-03 PROCEDURE — 77063 BREAST TOMOSYNTHESIS BI: CPT

## 2023-02-03 PROCEDURE — 77067 SCR MAMMO BI INCL CAD: CPT

## 2023-02-21 DIAGNOSIS — F41.9 ANXIETY: ICD-10-CM

## 2023-02-21 RX ORDER — BUSPIRONE HYDROCHLORIDE 15 MG/1
7.5 TABLET ORAL 2 TIMES DAILY
Qty: 90 TABLET | Refills: 1 | Status: SHIPPED | OUTPATIENT
Start: 2023-02-21 | End: 2023-05-22

## 2023-03-20 DIAGNOSIS — F41.9 ANXIETY: ICD-10-CM

## 2023-03-20 RX ORDER — FLUOXETINE HYDROCHLORIDE 20 MG/1
20 CAPSULE ORAL DAILY
Qty: 30 CAPSULE | Refills: 1 | Status: SHIPPED | OUTPATIENT
Start: 2023-03-20 | End: 2023-04-19

## 2023-03-20 NOTE — TELEPHONE ENCOUNTER
Caller: HoffmanStormy    Relationship: Self    Best call back number: [phone]    Requested Prescriptions:   Requested Prescriptions     Pending Prescriptions Disp Refills   • FLUoxetine (PROzac) 20 MG capsule 30 capsule 1     Sig: Take 1 capsule by mouth Daily for 30 days.        Pharmacy where request should be sent: [facility] [place] [address] [facility] [address] - [phone] [facility] [phone] FX     Additional details provided by patient: PATIENT APPOINTMENT MADE FOR THE NEXT AVAILABLE WITH PCP ON 04/25/2023 , PATIENT HAS LESS THAN THREE DAY SUPPLY ON HAND , STATED THE ABOVE MEDICATION IS WORKING WELL.     Does the patient have less than a 3 day supply:  [x] Yes  [] No    Would you like a call back once the refill request has been completed: [x] Yes [] No    If the office needs to give you a call back, can they leave a voicemail: [x] Yes [] No    Naeem Hurst Rep   03/20/23 12:11 EDT

## 2023-03-22 DIAGNOSIS — T78.40XD ALLERGY, SUBSEQUENT ENCOUNTER: ICD-10-CM

## 2023-03-23 RX ORDER — FLUTICASONE PROPIONATE 50 MCG
SPRAY, SUSPENSION (ML) NASAL
Qty: 16 G | Refills: 3 | Status: SHIPPED | OUTPATIENT
Start: 2023-03-23

## 2023-04-25 ENCOUNTER — OFFICE VISIT (OUTPATIENT)
Dept: INTERNAL MEDICINE | Facility: CLINIC | Age: 68
End: 2023-04-25
Payer: MEDICARE

## 2023-04-25 VITALS
DIASTOLIC BLOOD PRESSURE: 72 MMHG | BODY MASS INDEX: 32.2 KG/M2 | HEART RATE: 89 BPM | TEMPERATURE: 97 F | RESPIRATION RATE: 18 BRPM | SYSTOLIC BLOOD PRESSURE: 130 MMHG | HEIGHT: 62 IN | OXYGEN SATURATION: 94 % | WEIGHT: 175 LBS

## 2023-04-25 DIAGNOSIS — F41.9 ANXIETY: ICD-10-CM

## 2023-04-25 DIAGNOSIS — Z13.1 SCREENING FOR DIABETES MELLITUS: ICD-10-CM

## 2023-04-25 DIAGNOSIS — G25.81 RESTLESS LEG SYNDROME: ICD-10-CM

## 2023-04-25 DIAGNOSIS — R07.9 CHEST PAIN, UNSPECIFIED TYPE: Primary | ICD-10-CM

## 2023-04-25 DIAGNOSIS — G25.2 COARSE TREMORS: ICD-10-CM

## 2023-04-25 DIAGNOSIS — T78.40XD ALLERGY, SUBSEQUENT ENCOUNTER: ICD-10-CM

## 2023-04-25 DIAGNOSIS — E78.2 MIXED HYPERLIPIDEMIA: ICD-10-CM

## 2023-04-25 RX ORDER — UBIDECARENONE 75 MG
50 CAPSULE ORAL DAILY
COMMUNITY

## 2023-04-25 RX ORDER — PRAMIPEXOLE DIHYDROCHLORIDE 0.5 MG/1
0.5 TABLET ORAL DAILY
Qty: 90 TABLET | Refills: 1 | Status: SHIPPED | OUTPATIENT
Start: 2023-04-25 | End: 2023-07-24

## 2023-04-25 RX ORDER — ERGOCALCIFEROL (VITAMIN D2) 10 MCG
400 TABLET ORAL DAILY
COMMUNITY

## 2023-04-25 RX ORDER — FLUOXETINE HYDROCHLORIDE 20 MG/1
20 CAPSULE ORAL DAILY
Qty: 90 CAPSULE | Refills: 1 | Status: SHIPPED | OUTPATIENT
Start: 2023-04-25 | End: 2023-07-24

## 2023-04-25 RX ORDER — FLUTICASONE PROPIONATE 50 MCG
1 SPRAY, SUSPENSION (ML) NASAL DAILY
Qty: 16 G | Refills: 3 | Status: SHIPPED | OUTPATIENT
Start: 2023-04-25

## 2023-04-25 RX ORDER — ACETAMINOPHEN,DIPHENHYDRAMINE HCL 500; 25 MG/1; MG/1
1 TABLET, FILM COATED ORAL NIGHTLY PRN
COMMUNITY

## 2023-04-25 NOTE — PROGRESS NOTES
"Chief Complaint  Med Refill, labs, and Chest Pain (Discomfort after eating at a restaurant about 3.5 weeks ago and thought it was just indigestion from something she at. Took a antacid and the pain seemed to subside.)    Subjective            Stormy Hoffman presents to [facility]  History of Present Illness    Chest pain:  States she ate a restaurant a couple weeks ago and experienced chest pain, described as heavy and tight. States she had radiation into her shoulder and neck area. She thought it may have been related to indigestion. She opted to take tums and states she felt fine within a few minutes (5-10mns). States she discussed w/ her daughter who expressed concern about her symptoms and requested that she discussed this with us today.  Symptoms have not recurred.    Of note pt does experience this intermittently, unable to state exact frequency.    Medication refill: need refill for flonase.    Tremors:  Chronic.   States she believes this to be a family trait since she has multiple family members who \"shake\".  States her father, and her brother (younger by 1 year), older brother and older sister also shakes.  Denies anyone in the family ever getting evaluated for the tremors.  States her  has made comments that her shaking may be getting worse.   States her hand shakes when she is holding a mirror to put on her make up. States her  has also noted her head shaking at times. Patient states she feels like the shakes occur more so at rest.   Denies any memory problem.       Retired recently, stayed home for 7 mos and decided to go part time.     Past Medical History:   Diagnosis Date    Allergies     Anxiety     Arthritis     Asymptomatic microscopic hematuria 08/07/2017    Diarrhea 11/24/2020    chronic and worsening, exact etiology is unclear at this time. worrisome features include urgency and pt having to wake upa t night to defecate. Lack of " abdominal pain is reassuring. Will obtain labs and stool studies for fruther evaluation. We did discuss potential need for GI referral for colonoscopy with biopsy if symptoms persist and lab studies are unreavealing. Discussed red flag symptoms th    Edema     Fatigue 2019    Hematuria     Hyperlipidemia     Kidney stone     Osteoarthritis, hand, primary localized, unspecified laterality 2018    Primary osteoarthritis of right knee 2018    Restless leg syndrome     Seasonal allergies     Sinus trouble        Allergies:   Allergies   Allergen Reactions    Nitrofurantoin Anaphylaxis    Lisinopril Cough          Past Surgical History:   Procedure Laterality Date    TUBAL ABDOMINAL LIGATION  1990          Social History     Socioeconomic History    Marital status:    Tobacco Use    Smoking status: Former     Packs/day: 1.00     Years: 3.00     Pack years: 3.00     Types: Cigarettes     Start date:      Quit date:      Years since quittin.4    Smokeless tobacco: Never   Vaping Use    Vaping Use: Never used   Substance and Sexual Activity    Alcohol use: Yes     Comment: light 3/5/19 occasionally drinks    Drug use: Never         Family History   Problem Relation Age of Onset    Stroke Mother     Arthritis Mother     Osteoporosis Mother     Diabetes Father     Arthritis Sister     Osteoporosis Sister     Arthritis Brother           Health Maintenance Due   Topic Date Due    DXA SCAN  Never done    TDAP/TD VACCINES (1 - Tdap) Never done    Pneumococcal Vaccine 65+ (1 - PCV) Never done    COVID-19 Vaccine (4 - Pfizer series) 02/10/2022            Current Outpatient Medications:     calcium carbonate, oyster shell, 500 MG tablet tablet, TAKE ONE TABLET BY MOUTH EVERY DAY, Disp: 90 tablet, Rfl: 1    clindamycin (CLEOCIN T) 1 % external solution, Apply TO folliculitis ON NECK AND SCALP EVERY DAY AS NEEDED ITCHING, Disp: , Rfl:     diphenhydrAMINE-acetaminophen (TYLENOL PM)  " MG tablet per tablet, Take 1 tablet by mouth At Night As Needed for Sleep., Disp: , Rfl:     fexofenadine (ALLEGRA) 180 MG tablet, Take 1 tablet by mouth Daily for 90 days., Disp: 90 tablet, Rfl: 1    FLUoxetine (PROzac) 20 MG capsule, Take 1 capsule by mouth Daily for 90 days., Disp: 90 capsule, Rfl: 1    fluticasone (FLONASE) 50 MCG/ACT nasal spray, 1 spray into the nostril(s) as directed by provider Daily., Disp: 16 g, Rfl: 3    ibuprofen (ADVIL,MOTRIN) 600 MG tablet, Take 1 tablet by mouth Every 6 (Six) Hours As Needed for Moderate Pain ., Disp: 30 tablet, Rfl: 0    pramipexole (MIRAPEX) 0.5 MG tablet, Take 1 tablet by mouth Daily for 90 days., Disp: 90 tablet, Rfl: 1    Turmeric (QC TUMERIC COMPLEX PO), Take  by mouth Daily., Disp: , Rfl:     vitamin B-12 (CYANOCOBALAMIN) 100 MCG tablet, Take 50 mcg by mouth Daily., Disp: , Rfl:     Vitamin D, Cholecalciferol, (CHOLECALCIFEROL) 10 MCG (400 UNIT) tablet, Take 1 tablet by mouth Daily., Disp: , Rfl:       Immunization History   Administered Date(s) Administered    COVID-19 (PFIZER) Purple Cap Monovalent 03/17/2021, 04/07/2021, 12/16/2021    Flu Vaccine Quad PF >36MO 12/20/2019, 10/27/2020    FluLaval/Fluzone >6mos 10/27/2020    Fluzone High-Dose 65+yrs 10/28/2022    Influenza, Unspecified 12/20/2019    Shingrix 10/27/2020    Zoster, Unspecified 12/01/2020         Review of Systems       Objective       Vitals:    04/25/23 1128   BP: 130/72   BP Location: Left arm   Patient Position: Sitting   Cuff Size: Adult   Pulse: 89   Resp: 18   Temp: 97 °F (36.1 °C)   SpO2: 94%   Weight: 79.4 kg (175 lb)   Height: 157.5 cm (62.01\")     Body mass index is 32 kg/m².      Physical Exam  Vitals reviewed.   Constitutional:       Appearance: Normal appearance. She is well-developed.   HENT:      Head: Normocephalic and atraumatic.      Right Ear: External ear normal.      Left Ear: External ear normal.      Mouth/Throat:      Pharynx: No oropharyngeal exudate.   Eyes:      " Conjunctiva/sclera: Conjunctivae normal.      Pupils: Pupils are equal, round, and reactive to light.   Cardiovascular:      Rate and Rhythm: Normal rate and regular rhythm.      Pulses: Normal pulses.      Heart sounds: No murmur heard.    No friction rub. No gallop.   Pulmonary:      Effort: Pulmonary effort is normal.      Breath sounds: Normal breath sounds. No wheezing or rhonchi.   Abdominal:      General: Bowel sounds are normal. There is no distension.      Palpations: Abdomen is soft.      Tenderness: There is no abdominal tenderness.   Skin:     General: Skin is warm and dry.      Findings: No rash.   Neurological:      Mental Status: She is alert and oriented to person, place, and time.      Cranial Nerves: No cranial nerve deficit.   Psychiatric:         Mood and Affect: Mood and affect normal.         Behavior: Behavior normal.         Thought Content: Thought content normal.         Judgment: Judgment normal.           Result Review :                           Assessment and Plan      Diagnoses and all orders for this visit:    1. Chest pain, unspecified type (Primary)  Comments:  Acute onset which self resolved w/o any recurrence. Symptoms concerning for possible indigestion. Discussed importance of presenting for evaluation for chest pan w/ worrisome features.  Orders:  -     Comprehensive Metabolic Panel  -     CBC & Differential  -     TSH  -     Cancel: Lipid Panel  -     T4, Free  -     Vitamin B12  -     Folate  -     ECG 12 Lead    2. Allergy, subsequent encounter  Comments:  Chronic with current exacerbation.  Recommend Flonase and Singulair which were prescribed.  Orders:  -     fluticasone (FLONASE) 50 MCG/ACT nasal spray; 1 spray into the nostril(s) as directed by provider Daily.  Dispense: 16 g; Refill: 3    3. Mixed hyperlipidemia  Comments:  Chronic, due for labs.    4. Coarse tremors  Comments:  Concern for essential tremors give + fhx and timing of tremors.  Orders:  -     TSH  -      T4, Free  -     Ambulatory Referral to Neurology    5. Restless leg syndrome  Comments:  Chronic and stable. Refilled meds.   Orders:  -     pramipexole (MIRAPEX) 0.5 MG tablet; Take 1 tablet by mouth Daily for 90 days.  Dispense: 90 tablet; Refill: 1    6. Anxiety  Comments:  Chronic and worsening. Starting prozac 20mg daily w/ close f/u in 4 wks. Continue buspar.   Orders:  -     FLUoxetine (PROzac) 20 MG capsule; Take 1 capsule by mouth Daily for 90 days.  Dispense: 90 capsule; Refill: 1    7. Screening for diabetes mellitus    8. BMI 32.0-32.9,adult                  Follow Up     Return in about 3 months (around 7/25/2023) for tremor, HLD.    Patient was given instructions and counseling regarding her condition or for health maintenance advice. Please see specific information pulled into the AVS if appropriate.     Tressa Schulte MD   Internal Medicine-Pediatrics

## 2023-05-04 ENCOUNTER — CLINICAL SUPPORT (OUTPATIENT)
Dept: INTERNAL MEDICINE | Facility: CLINIC | Age: 68
End: 2023-05-04
Payer: MEDICARE

## 2023-05-04 DIAGNOSIS — E78.5 HYPERLIPIDEMIA, UNSPECIFIED HYPERLIPIDEMIA TYPE: ICD-10-CM

## 2023-05-04 LAB
ALBUMIN SERPL-MCNC: 4.2 G/DL (ref 3.5–5.2)
ALBUMIN/GLOB SERPL: 1.7 G/DL
ALP SERPL-CCNC: 85 U/L (ref 39–117)
ALT SERPL W P-5'-P-CCNC: 14 U/L (ref 1–33)
ANION GAP SERPL CALCULATED.3IONS-SCNC: 9 MMOL/L (ref 5–15)
AST SERPL-CCNC: 18 U/L (ref 1–32)
BASOPHILS # BLD AUTO: 0.1 10*3/MM3 (ref 0–0.2)
BASOPHILS NFR BLD AUTO: 1.8 % (ref 0–1.5)
BILIRUB SERPL-MCNC: 0.4 MG/DL (ref 0–1.2)
BUN SERPL-MCNC: 17 MG/DL (ref 8–23)
BUN/CREAT SERPL: 26.2 (ref 7–25)
CALCIUM SPEC-SCNC: 9.1 MG/DL (ref 8.6–10.5)
CHLORIDE SERPL-SCNC: 102 MMOL/L (ref 98–107)
CHOLEST SERPL-MCNC: 294 MG/DL (ref 0–200)
CO2 SERPL-SCNC: 26 MMOL/L (ref 22–29)
CREAT SERPL-MCNC: 0.65 MG/DL (ref 0.57–1)
DEPRECATED RDW RBC AUTO: 38.2 FL (ref 37–54)
EGFRCR SERPLBLD CKD-EPI 2021: 96.6 ML/MIN/1.73
EOSINOPHIL # BLD AUTO: 0.25 10*3/MM3 (ref 0–0.4)
EOSINOPHIL NFR BLD AUTO: 4.6 % (ref 0.3–6.2)
ERYTHROCYTE [DISTWIDTH] IN BLOOD BY AUTOMATED COUNT: 12.1 % (ref 12.3–15.4)
FOLATE SERPL-MCNC: 10.8 NG/ML (ref 4.78–24.2)
GLOBULIN UR ELPH-MCNC: 2.5 GM/DL
GLUCOSE SERPL-MCNC: 84 MG/DL (ref 65–99)
HCT VFR BLD AUTO: 36.1 % (ref 34–46.6)
HDLC SERPL-MCNC: 59 MG/DL (ref 40–60)
HGB BLD-MCNC: 12.1 G/DL (ref 12–15.9)
IMM GRANULOCYTES # BLD AUTO: 0.01 10*3/MM3 (ref 0–0.05)
IMM GRANULOCYTES NFR BLD AUTO: 0.2 % (ref 0–0.5)
LDLC SERPL CALC-MCNC: 191 MG/DL (ref 0–100)
LDLC/HDLC SERPL: 3.2 {RATIO}
LYMPHOCYTES # BLD AUTO: 1.5 10*3/MM3 (ref 0.7–3.1)
LYMPHOCYTES NFR BLD AUTO: 27.7 % (ref 19.6–45.3)
MCH RBC QN AUTO: 29.4 PG (ref 26.6–33)
MCHC RBC AUTO-ENTMCNC: 33.5 G/DL (ref 31.5–35.7)
MCV RBC AUTO: 87.6 FL (ref 79–97)
MONOCYTES # BLD AUTO: 0.38 10*3/MM3 (ref 0.1–0.9)
MONOCYTES NFR BLD AUTO: 7 % (ref 5–12)
NEUTROPHILS NFR BLD AUTO: 3.18 10*3/MM3 (ref 1.7–7)
NEUTROPHILS NFR BLD AUTO: 58.7 % (ref 42.7–76)
NRBC BLD AUTO-RTO: 0 /100 WBC (ref 0–0.2)
PLATELET # BLD AUTO: 316 10*3/MM3 (ref 140–450)
PMV BLD AUTO: 9.9 FL (ref 6–12)
POTASSIUM SERPL-SCNC: 3.8 MMOL/L (ref 3.5–5.2)
PROT SERPL-MCNC: 6.7 G/DL (ref 6–8.5)
RBC # BLD AUTO: 4.12 10*6/MM3 (ref 3.77–5.28)
SODIUM SERPL-SCNC: 137 MMOL/L (ref 136–145)
T4 FREE SERPL-MCNC: 1.18 NG/DL (ref 0.93–1.7)
TRIGL SERPL-MCNC: 232 MG/DL (ref 0–150)
TSH SERPL DL<=0.05 MIU/L-ACNC: 1.59 UIU/ML (ref 0.27–4.2)
VIT B12 BLD-MCNC: >2000 PG/ML (ref 211–946)
VLDLC SERPL-MCNC: 44 MG/DL (ref 5–40)
WBC NRBC COR # BLD: 5.42 10*3/MM3 (ref 3.4–10.8)

## 2023-05-04 PROCEDURE — 82607 VITAMIN B-12: CPT | Performed by: STUDENT IN AN ORGANIZED HEALTH CARE EDUCATION/TRAINING PROGRAM

## 2023-05-04 PROCEDURE — 82746 ASSAY OF FOLIC ACID SERUM: CPT | Performed by: STUDENT IN AN ORGANIZED HEALTH CARE EDUCATION/TRAINING PROGRAM

## 2023-05-04 PROCEDURE — 80061 LIPID PANEL: CPT | Performed by: STUDENT IN AN ORGANIZED HEALTH CARE EDUCATION/TRAINING PROGRAM

## 2023-05-04 PROCEDURE — 80053 COMPREHEN METABOLIC PANEL: CPT | Performed by: STUDENT IN AN ORGANIZED HEALTH CARE EDUCATION/TRAINING PROGRAM

## 2023-05-04 PROCEDURE — 84439 ASSAY OF FREE THYROXINE: CPT | Performed by: STUDENT IN AN ORGANIZED HEALTH CARE EDUCATION/TRAINING PROGRAM

## 2023-05-04 PROCEDURE — 84443 ASSAY THYROID STIM HORMONE: CPT | Performed by: STUDENT IN AN ORGANIZED HEALTH CARE EDUCATION/TRAINING PROGRAM

## 2023-05-04 PROCEDURE — 85025 COMPLETE CBC W/AUTO DIFF WBC: CPT | Performed by: STUDENT IN AN ORGANIZED HEALTH CARE EDUCATION/TRAINING PROGRAM

## 2023-05-04 PROCEDURE — 36415 COLL VENOUS BLD VENIPUNCTURE: CPT | Performed by: STUDENT IN AN ORGANIZED HEALTH CARE EDUCATION/TRAINING PROGRAM

## 2023-05-04 NOTE — PROGRESS NOTES
Venipuncture Blood Specimen Collection  Venipuncture performed in right AC by Melanie Jose MA with good hemostasis. Patient tolerated the procedure well without complications.   05/04/23   Melanie Jose MA

## 2023-05-09 ENCOUNTER — TELEPHONE (OUTPATIENT)
Dept: INTERNAL MEDICINE | Facility: CLINIC | Age: 68
End: 2023-05-09
Payer: MEDICARE

## 2023-05-09 NOTE — TELEPHONE ENCOUNTER
Caller: Stormy Hoffman    Relationship: Self    Best call back number: [phone]    What is the best time to reach you: ANY    Who are you requesting to speak with (clinical staff, provider,  specific staff member): CLINICAL STAFF    What was the call regarding: PATIENT WOULD LIKE TO SPEAK TO SOMEONE REGARDING HER REFERRAL.    Do you require a callback: YES

## 2023-07-25 ENCOUNTER — OFFICE VISIT (OUTPATIENT)
Dept: INTERNAL MEDICINE | Facility: CLINIC | Age: 68
End: 2023-07-25
Payer: MEDICARE

## 2023-07-25 VITALS
HEART RATE: 88 BPM | SYSTOLIC BLOOD PRESSURE: 124 MMHG | TEMPERATURE: 97.7 F | HEIGHT: 62 IN | DIASTOLIC BLOOD PRESSURE: 72 MMHG | RESPIRATION RATE: 19 BRPM | BODY MASS INDEX: 31.94 KG/M2 | WEIGHT: 173.6 LBS | OXYGEN SATURATION: 97 %

## 2023-07-25 DIAGNOSIS — Z78.0 POSTMENOPAUSAL: ICD-10-CM

## 2023-07-25 DIAGNOSIS — Z13.820 SCREENING FOR OSTEOPOROSIS: ICD-10-CM

## 2023-07-25 DIAGNOSIS — R22.42 LOCALIZED SWELLING OF LEFT FOOT: ICD-10-CM

## 2023-07-25 DIAGNOSIS — Z76.0 MEDICATION REFILL: ICD-10-CM

## 2023-07-25 DIAGNOSIS — E78.2 MIXED HYPERLIPIDEMIA: Primary | ICD-10-CM

## 2023-07-25 DIAGNOSIS — Z23 ENCOUNTER FOR IMMUNIZATION: ICD-10-CM

## 2023-07-25 DIAGNOSIS — R79.89 HIGH SERUM VITAMIN B12: ICD-10-CM

## 2023-07-25 DIAGNOSIS — G25.2 COARSE TREMORS: ICD-10-CM

## 2023-07-25 LAB
CHOLEST SERPL-MCNC: 284 MG/DL (ref 0–200)
HDLC SERPL-MCNC: 60 MG/DL (ref 40–60)
LDLC SERPL CALC-MCNC: 183 MG/DL (ref 0–100)
LDLC/HDLC SERPL: 3 {RATIO}
TRIGL SERPL-MCNC: 220 MG/DL (ref 0–150)
VIT B12 BLD-MCNC: 1364 PG/ML (ref 211–946)
VLDLC SERPL-MCNC: 41 MG/DL (ref 5–40)

## 2023-07-25 PROCEDURE — 80061 LIPID PANEL: CPT | Performed by: STUDENT IN AN ORGANIZED HEALTH CARE EDUCATION/TRAINING PROGRAM

## 2023-07-25 PROCEDURE — 82607 VITAMIN B-12: CPT | Performed by: STUDENT IN AN ORGANIZED HEALTH CARE EDUCATION/TRAINING PROGRAM

## 2023-07-25 RX ORDER — FLUTICASONE PROPIONATE 50 MCG
1 SPRAY, SUSPENSION (ML) NASAL DAILY
Qty: 16 G | Refills: 3 | Status: SHIPPED | OUTPATIENT
Start: 2023-07-25

## 2023-07-25 RX ORDER — FOLIC ACID 1 MG/1
1 TABLET ORAL DAILY
COMMUNITY

## 2023-07-25 RX ORDER — BUSPIRONE HYDROCHLORIDE 15 MG/1
15 TABLET ORAL 3 TIMES DAILY
COMMUNITY
End: 2023-07-25

## 2023-07-25 RX ORDER — ASPIRIN 81 MG/1
81 TABLET ORAL DAILY
COMMUNITY

## 2023-07-25 RX ORDER — FLUOXETINE HYDROCHLORIDE 20 MG/1
20 CAPSULE ORAL DAILY
Qty: 90 CAPSULE | Refills: 1 | Status: SHIPPED | OUTPATIENT
Start: 2023-07-25 | End: 2023-10-23

## 2023-07-25 RX ORDER — PRAMIPEXOLE DIHYDROCHLORIDE 0.5 MG/1
0.5 TABLET ORAL DAILY
Qty: 90 TABLET | Refills: 1 | Status: SHIPPED | OUTPATIENT
Start: 2023-07-25 | End: 2023-10-23

## 2023-07-25 NOTE — PROGRESS NOTES
"Chief Complaint  Hyperlipidemia (3 month follow up), tremor, and Foot Injury (Spot on left foot. Happened about a year ago, no pain when walking, some shoes bother it. Going up and down steps seems to bother it.)    Subjective            Stormy Hoffman presents to [facility]  History of Present Illness    Hyperlipidemia:  Chronic, due for repeat labs.     Tremor:  Discussed at last visit on 4/25.  Referred to neurology, states she had multiple testing done which were negative.      Swelling of left foot:  States she was doing barre classes for 1 year and wonders if she may have injured it.   Has chronic, persistent swelling over the lateral aspect of the left foot, in front of the out part of the left ankle.   Endorses pain, \"not a horrible pain\" but hard to describe, localized over the lateral aspect of her left foot and into the soles. Worse when she is going up stairs.   States if she has \"good shoes\" on then she has less pain.   She does not currently follow with a podiatrist but does have a hammer toe (left 2nd toe) and has been considering going to a podiatrist.   She also has a hx of plantar fascitis in the left foot for which she's had injections in her foot in the past.   Has had shoe insert in past.     Past Medical History:   Diagnosis Date    Allergies     Anxiety     Arthritis     Asymptomatic microscopic hematuria 08/07/2017    Diarrhea 11/24/2020    chronic and worsening, exact etiology is unclear at this time. worrisome features include urgency and pt having to wake upa t night to defecate. Lack of abdominal pain is reassuring. Will obtain labs and stool studies for fruther evaluation. We did discuss potential need for GI referral for colonoscopy with biopsy if symptoms persist and lab studies are unreavealing. Discussed red flag symptoms th    Edema     Fatigue 07/29/2019    Hematuria     Hyperlipidemia     Kidney stone     Osteoarthritis, hand, " primary localized, unspecified laterality 2018    Primary osteoarthritis of right knee 2018    Restless leg syndrome     Seasonal allergies     Sinus trouble        Allergies:   Allergies   Allergen Reactions    Nitrofurantoin Anaphylaxis    Lisinopril Cough          Past Surgical History:   Procedure Laterality Date    TUBAL ABDOMINAL LIGATION  1990          Social History     Socioeconomic History    Marital status:    Tobacco Use    Smoking status: Former     Packs/day: 1.00     Years: 3.00     Pack years: 3.00     Types: Cigarettes     Start date:      Quit date:      Years since quittin.6    Smokeless tobacco: Never   Vaping Use    Vaping Use: Never used   Substance and Sexual Activity    Alcohol use: Yes     Comment: light 3/5/19 occasionally drinks    Drug use: Never         Family History   Problem Relation Age of Onset    Stroke Mother     Arthritis Mother     Osteoporosis Mother     Diabetes Father     Arthritis Sister     Osteoporosis Sister     Arthritis Brother           Health Maintenance Due   Topic Date Due    DXA SCAN  Never done    TDAP/TD VACCINES (1 - Tdap) Never done    COVID-19 Vaccine (4 - Pfizer series) 02/10/2022            Current Outpatient Medications:     aspirin 81 MG EC tablet, Take 1 tablet by mouth Daily., Disp: , Rfl:     clindamycin (CLEOCIN T) 1 % external solution, Apply TO folliculitis ON NECK AND SCALP EVERY DAY AS NEEDED ITCHING, Disp: , Rfl:     diphenhydrAMINE-acetaminophen (TYLENOL PM)  MG tablet per tablet, Take 1 tablet by mouth At Night As Needed for Sleep., Disp: , Rfl:     fexofenadine (ALLEGRA) 180 MG tablet, Take 1 tablet by mouth Daily for 90 days., Disp: 90 tablet, Rfl: 1    FLUoxetine (PROzac) 20 MG capsule, Take 1 capsule by mouth Daily for 90 days., Disp: 90 capsule, Rfl: 1    fluticasone (FLONASE) 50 MCG/ACT nasal spray, 1 spray into the nostril(s) as directed by provider Daily., Disp: 16 g, Rfl: 3    folic acid  "(FOLVITE) 1 MG tablet, Take 1 tablet by mouth Daily., Disp: , Rfl:     ibuprofen (ADVIL,MOTRIN) 600 MG tablet, Take 1 tablet by mouth Every 6 (Six) Hours As Needed for Moderate Pain ., Disp: 30 tablet, Rfl: 0    pramipexole (MIRAPEX) 0.5 MG tablet, Take 1 tablet by mouth Daily for 90 days., Disp: 90 tablet, Rfl: 1    Turmeric (QC TUMERIC COMPLEX PO), Take  by mouth Daily., Disp: , Rfl:     vitamin B-12 (CYANOCOBALAMIN) 100 MCG tablet, Take 50 mcg by mouth Daily., Disp: , Rfl:     Vitamin D, Cholecalciferol, (CHOLECALCIFEROL) 10 MCG (400 UNIT) tablet, Take 1 tablet by mouth Daily., Disp: , Rfl:     calcium carbonate, oyster shell, 500 MG tablet tablet, TAKE ONE TABLET BY MOUTH EVERY DAY, Disp: 90 tablet, Rfl: 1      Immunization History   Administered Date(s) Administered    COVID-19 (PFIZER) Purple Cap Monovalent 03/17/2021, 04/07/2021, 12/16/2021    Flu Vaccine Quad PF >36MO 12/20/2019, 10/27/2020    Fluzone >6mos 10/27/2020    Fluzone High-Dose 65+yrs 10/28/2022    Influenza, Unspecified 12/20/2019    Pneumococcal Conjugate 20-Valent (PCV20) 07/25/2023    Shingrix 10/27/2020    Zoster, Unspecified 12/01/2020         Review of Systems       Objective       Vitals:    07/25/23 0815   BP: 124/72   Pulse: 88   Resp: 19   Temp: 97.7 øF (36.5 øC)   SpO2: 97%   Weight: 78.7 kg (173 lb 9.6 oz)   Height: 157.5 cm (62.01\")     Body mass index is 31.74 kg/mý.      Physical Exam  Vitals reviewed.   Constitutional:       Appearance: Normal appearance.   HENT:      Head: Normocephalic and atraumatic.      Nose: Nose normal.   Eyes:      Extraocular Movements: Extraocular movements intact.      Conjunctiva/sclera: Conjunctivae normal.   Pulmonary:      Effort: Pulmonary effort is normal. No respiratory distress.   Musculoskeletal:         General: Swelling (over left forefoot.) present. Normal range of motion.   Skin:     General: Skin is warm and dry.   Neurological:      General: No focal deficit present.      Mental Status: " She is alert and oriented to person, place, and time.      Cranial Nerves: No cranial nerve deficit.   Psychiatric:         Mood and Affect: Mood normal.         Behavior: Behavior normal.         Thought Content: Thought content normal.           Result Review :                           Assessment and Plan      Diagnoses and all orders for this visit:    1. Mixed hyperlipidemia (Primary)  Comments:  Due for labs.  Orders:  -     Lipid panel    2. Coarse tremors  Comments:  Chronic, likely essential tremor. Discussed BB as treatment option which she will consider.    3. Localized swelling of left foot  Comments:  Subacute to chronic. Referring to podiatry.  Orders:  -     Ambulatory Referral to Podiatry    4. High serum vitamin B12  Comments:  Noted on previous labs. Due for repeat.  Orders:  -     Vitamin B12    5. Postmenopausal  Comments:  Due for screening for osteoporosis.  Orders:  -     DEXA Bone Density Axial    6. Screening for osteoporosis  Comments:  Due for screening.  Orders:  -     DEXA Bone Density Axial    7. Encounter for immunization  Comments:  Due for PCV20, administered in office and pt tolerated well.  Orders:  -     Pneumococcal Conjugate Vaccine 20-Valent (PCV20)    8. Medication refill  Comments:  Refilled meds.  Orders:  -     pramipexole (MIRAPEX) 0.5 MG tablet; Take 1 tablet by mouth Daily for 90 days.  Dispense: 90 tablet; Refill: 1  -     FLUoxetine (PROzac) 20 MG capsule; Take 1 capsule by mouth Daily for 90 days.  Dispense: 90 capsule; Refill: 1  -     fluticasone (FLONASE) 50 MCG/ACT nasal spray; 1 spray into the nostril(s) as directed by provider Daily.  Dispense: 16 g; Refill: 3            Follow Up     Return in about 3 months (around 10/25/2023) for Medicare Wellness.    Patient was given instructions and counseling regarding her condition or for health maintenance advice. Please see specific information pulled into the AVS if appropriate.     Tressa Schulte MD   Internal  Medicine-Pediatrics

## 2023-08-01 DIAGNOSIS — M19.90 UNSPECIFIED OSTEOARTHRITIS, UNSPECIFIED SITE: ICD-10-CM

## 2023-08-01 RX ORDER — CALCIUM CARBONATE 500(1250)
TABLET ORAL
Qty: 90 TABLET | Refills: 1 | Status: SHIPPED | OUTPATIENT
Start: 2023-08-01

## 2023-08-22 ENCOUNTER — HOSPITAL ENCOUNTER (OUTPATIENT)
Dept: BONE DENSITY | Facility: HOSPITAL | Age: 68
Discharge: HOME | End: 2023-08-22
Admitting: STUDENT IN AN ORGANIZED HEALTH CARE EDUCATION/TRAINING PROGRAM
Payer: MEDICARE

## 2023-08-22 PROCEDURE — 77080 DXA BONE DENSITY AXIAL: CPT

## 2023-09-29 ENCOUNTER — TELEPHONE (OUTPATIENT)
Dept: INTERNAL MEDICINE | Facility: CLINIC | Age: 68
End: 2023-09-29
Payer: MEDICARE

## 2023-09-29 NOTE — TELEPHONE ENCOUNTER
Caller: Stormy Hoffman    Relationship: Self    Best call back number: [phone]     What is the best time to reach you: ANYTIME BUT AFTER 230PM IS BETTER    Who are you requesting to speak with (clinical staff, provider,  specific staff member): CLINICAL       What was the call regarding: PATIENT WENT TO [facility] NEAR [facility] ON 9.23.23. PATIENT HAD SIDE AND UPPER BACK PAIN. PATIENT WAS DIAGNOSED WITH A UTI AND GIVEN ANTIBIOTICS AND AN INJECTION FOR THE PAIN.     PATIENT STATES SOME OF THE PAIN WAS IN MID BACK UNDER SHOULDER BLADE ON LEFT SIDE. PATIENT STATES THE PAIN STARTED TO MOVE AND SHE COULD FEEL IT UNDER THE LEFT BREAST.    PATIENT WAS TOLD BY URGENT CARE THAT IF THE MEDICATIONS DONT START TO WORK TO SEE HER PROVIDER AND SAID CT SCANS MAY BE NEEDED.     PATIENT HAS ATTEMPTED TO SCHEDULE BUT FIRST AVAILABLE APPOINTMENT IS AFTER THE APPOINTMENT SHE ALREADY HAS ON 10.31.23.    PATIENT IS ASKING FOR A CALL BACK WITH A PLAN OF CARE PLEASE LEAVE A DETAILED MESSAGE IF NO ANSWER.

## 2023-09-30 ENCOUNTER — APPOINTMENT (OUTPATIENT)
Dept: CT IMAGING | Facility: HOSPITAL | Age: 68
End: 2023-09-30
Payer: MEDICARE

## 2023-09-30 ENCOUNTER — HOSPITAL ENCOUNTER (EMERGENCY)
Facility: HOSPITAL | Age: 68
Discharge: HOME | End: 2023-09-30
Attending: EMERGENCY MEDICINE
Payer: MEDICARE

## 2023-09-30 VITALS
HEART RATE: 81 BPM | DIASTOLIC BLOOD PRESSURE: 58 MMHG | BODY MASS INDEX: 33.63 KG/M2 | SYSTOLIC BLOOD PRESSURE: 102 MMHG | HEIGHT: 62 IN | RESPIRATION RATE: 16 BRPM | OXYGEN SATURATION: 96 % | TEMPERATURE: 97.3 F | WEIGHT: 182.76 LBS

## 2023-09-30 DIAGNOSIS — M94.0 COSTOCHONDRITIS, ACUTE: Primary | ICD-10-CM

## 2023-09-30 DIAGNOSIS — M54.9 MID BACK PAIN ON LEFT SIDE: ICD-10-CM

## 2023-09-30 LAB
ALBUMIN SERPL-MCNC: 4.3 G/DL (ref 3.5–5.2)
ALBUMIN/GLOB SERPL: 1.6 G/DL
ALP SERPL-CCNC: 98 U/L (ref 39–117)
ALT SERPL W P-5'-P-CCNC: 14 U/L (ref 1–33)
ANION GAP SERPL CALCULATED.3IONS-SCNC: 10.5 MMOL/L (ref 5–15)
AST SERPL-CCNC: 15 U/L (ref 1–32)
BACTERIA UR QL AUTO: ABNORMAL /HPF
BASOPHILS # BLD AUTO: 0.09 10*3/MM3 (ref 0–0.2)
BASOPHILS NFR BLD AUTO: 1.1 % (ref 0–1.5)
BILIRUB SERPL-MCNC: 0.3 MG/DL (ref 0–1.2)
BILIRUB UR QL STRIP: NEGATIVE
BUN SERPL-MCNC: 15 MG/DL (ref 8–23)
BUN/CREAT SERPL: 22.4 (ref 7–25)
CALCIUM SPEC-SCNC: 9.6 MG/DL (ref 8.6–10.5)
CHLORIDE SERPL-SCNC: 103 MMOL/L (ref 98–107)
CLARITY UR: CLEAR
CO2 SERPL-SCNC: 25.5 MMOL/L (ref 22–29)
COLOR UR: YELLOW
CREAT SERPL-MCNC: 0.67 MG/DL (ref 0.57–1)
D-LACTATE SERPL-SCNC: 1 MMOL/L (ref 0.5–2)
DEPRECATED RDW RBC AUTO: 43.4 FL (ref 37–54)
EGFRCR SERPLBLD CKD-EPI 2021: 95.9 ML/MIN/1.73
EOSINOPHIL # BLD AUTO: 0.33 10*3/MM3 (ref 0–0.4)
EOSINOPHIL NFR BLD AUTO: 4.1 % (ref 0.3–6.2)
ERYTHROCYTE [DISTWIDTH] IN BLOOD BY AUTOMATED COUNT: 13.1 % (ref 12.3–15.4)
GLOBULIN UR ELPH-MCNC: 2.7 GM/DL
GLUCOSE SERPL-MCNC: 105 MG/DL (ref 65–99)
GLUCOSE UR STRIP-MCNC: NEGATIVE MG/DL
HCT VFR BLD AUTO: 35.5 % (ref 34–46.6)
HGB BLD-MCNC: 11.6 G/DL (ref 12–15.9)
HGB UR QL STRIP.AUTO: ABNORMAL
HOLD SPECIMEN: NORMAL
HOLD SPECIMEN: NORMAL
HYALINE CASTS UR QL AUTO: ABNORMAL /LPF
IMM GRANULOCYTES # BLD AUTO: 0.03 10*3/MM3 (ref 0–0.05)
IMM GRANULOCYTES NFR BLD AUTO: 0.4 % (ref 0–0.5)
KETONES UR QL STRIP: NEGATIVE
LEUKOCYTE ESTERASE UR QL STRIP.AUTO: NEGATIVE
LIPASE SERPL-CCNC: 37 U/L (ref 13–60)
LYMPHOCYTES # BLD AUTO: 1.64 10*3/MM3 (ref 0.7–3.1)
LYMPHOCYTES NFR BLD AUTO: 20.6 % (ref 19.6–45.3)
MCH RBC QN AUTO: 29.3 PG (ref 26.6–33)
MCHC RBC AUTO-ENTMCNC: 32.7 G/DL (ref 31.5–35.7)
MCV RBC AUTO: 89.6 FL (ref 79–97)
MONOCYTES # BLD AUTO: 0.74 10*3/MM3 (ref 0.1–0.9)
MONOCYTES NFR BLD AUTO: 9.3 % (ref 5–12)
NEUTROPHILS NFR BLD AUTO: 5.15 10*3/MM3 (ref 1.7–7)
NEUTROPHILS NFR BLD AUTO: 64.5 % (ref 42.7–76)
NITRITE UR QL STRIP: NEGATIVE
NRBC BLD AUTO-RTO: 0 /100 WBC (ref 0–0.2)
PH UR STRIP.AUTO: 5.5 [PH] (ref 5–8)
PLATELET # BLD AUTO: 324 10*3/MM3 (ref 140–450)
PMV BLD AUTO: 9.1 FL (ref 6–12)
POTASSIUM SERPL-SCNC: 3.9 MMOL/L (ref 3.5–5.2)
PROT SERPL-MCNC: 7 G/DL (ref 6–8.5)
PROT UR QL STRIP: NEGATIVE
RBC # BLD AUTO: 3.96 10*6/MM3 (ref 3.77–5.28)
RBC # UR STRIP: ABNORMAL /HPF
REF LAB TEST METHOD: ABNORMAL
SODIUM SERPL-SCNC: 139 MMOL/L (ref 136–145)
SP GR UR STRIP: 1.02 (ref 1–1.03)
SQUAMOUS #/AREA URNS HPF: ABNORMAL /HPF
UROBILINOGEN UR QL STRIP: ABNORMAL
WBC # UR STRIP: ABNORMAL /HPF
WBC NRBC COR # BLD: 7.98 10*3/MM3 (ref 3.4–10.8)
WHOLE BLOOD HOLD COAG: NORMAL
WHOLE BLOOD HOLD SPECIMEN: NORMAL

## 2023-09-30 PROCEDURE — 83605 ASSAY OF LACTIC ACID: CPT | Performed by: EMERGENCY MEDICINE

## 2023-09-30 PROCEDURE — 99284 EMERGENCY DEPT VISIT MOD MDM: CPT

## 2023-09-30 PROCEDURE — 74176 CT ABD & PELVIS W/O CONTRAST: CPT

## 2023-09-30 PROCEDURE — 96374 THER/PROPH/DIAG INJ IV PUSH: CPT

## 2023-09-30 PROCEDURE — 81001 URINALYSIS AUTO W/SCOPE: CPT | Performed by: EMERGENCY MEDICINE

## 2023-09-30 PROCEDURE — 25010000002 KETOROLAC TROMETHAMINE PER 15 MG: Performed by: NURSE PRACTITIONER

## 2023-09-30 PROCEDURE — 36415 COLL VENOUS BLD VENIPUNCTURE: CPT

## 2023-09-30 PROCEDURE — 83690 ASSAY OF LIPASE: CPT | Performed by: EMERGENCY MEDICINE

## 2023-09-30 PROCEDURE — 85025 COMPLETE CBC W/AUTO DIFF WBC: CPT | Performed by: EMERGENCY MEDICINE

## 2023-09-30 PROCEDURE — 80053 COMPREHEN METABOLIC PANEL: CPT | Performed by: EMERGENCY MEDICINE

## 2023-09-30 PROCEDURE — 25010000002 ONDANSETRON PER 1 MG: Performed by: NURSE PRACTITIONER

## 2023-09-30 PROCEDURE — 25010000002 DEXAMETHASONE SODIUM PHOSPHATE 10 MG/ML SOLUTION: Performed by: NURSE PRACTITIONER

## 2023-09-30 PROCEDURE — 96375 TX/PRO/DX INJ NEW DRUG ADDON: CPT

## 2023-09-30 PROCEDURE — 96361 HYDRATE IV INFUSION ADD-ON: CPT

## 2023-09-30 RX ORDER — DEXAMETHASONE SODIUM PHOSPHATE 10 MG/ML
10 INJECTION, SOLUTION INTRAMUSCULAR; INTRAVENOUS ONCE
Status: COMPLETED | OUTPATIENT
Start: 2023-09-30 | End: 2023-09-30

## 2023-09-30 RX ORDER — ONDANSETRON 2 MG/ML
4 INJECTION INTRAMUSCULAR; INTRAVENOUS ONCE
Status: COMPLETED | OUTPATIENT
Start: 2023-09-30 | End: 2023-09-30

## 2023-09-30 RX ORDER — ONDANSETRON 4 MG/1
4 TABLET, ORALLY DISINTEGRATING ORAL 4 TIMES DAILY
Qty: 20 TABLET | Refills: 0 | Status: SHIPPED | OUTPATIENT
Start: 2023-09-30

## 2023-09-30 RX ORDER — KETOROLAC TROMETHAMINE 10 MG/1
10 TABLET, FILM COATED ORAL EVERY 6 HOURS PRN
Qty: 20 TABLET | Refills: 0 | Status: SHIPPED | OUTPATIENT
Start: 2023-09-30

## 2023-09-30 RX ORDER — SODIUM CHLORIDE 0.9 % (FLUSH) 0.9 %
10 SYRINGE (ML) INJECTION AS NEEDED
Status: DISCONTINUED | OUTPATIENT
Start: 2023-09-30 | End: 2023-09-30 | Stop reason: HOSPADM

## 2023-09-30 RX ORDER — PREDNISONE 20 MG/1
20 TABLET ORAL
Qty: 15 TABLET | Refills: 0 | Status: SHIPPED | OUTPATIENT
Start: 2023-09-30 | End: 2023-10-05

## 2023-09-30 RX ORDER — HYDROXYZINE HYDROCHLORIDE 25 MG/1
25 TABLET, FILM COATED ORAL NIGHTLY PRN
Qty: 20 TABLET | Refills: 0 | Status: SHIPPED | OUTPATIENT
Start: 2023-09-30

## 2023-09-30 RX ORDER — KETOROLAC TROMETHAMINE 30 MG/ML
15 INJECTION, SOLUTION INTRAMUSCULAR; INTRAVENOUS ONCE
Status: COMPLETED | OUTPATIENT
Start: 2023-09-30 | End: 2023-09-30

## 2023-09-30 RX ADMIN — KETOROLAC TROMETHAMINE 15 MG: 30 INJECTION, SOLUTION INTRAMUSCULAR; INTRAVENOUS at 13:12

## 2023-09-30 RX ADMIN — DEXAMETHASONE SODIUM PHOSPHATE 10 MG: 10 INJECTION INTRAMUSCULAR; INTRAVENOUS at 14:19

## 2023-09-30 RX ADMIN — ONDANSETRON 4 MG: 2 INJECTION INTRAMUSCULAR; INTRAVENOUS at 13:11

## 2023-09-30 RX ADMIN — SODIUM CHLORIDE 1000 ML: 9 INJECTION, SOLUTION INTRAVENOUS at 13:10

## 2023-09-30 NOTE — ED PROVIDER NOTES
Time: 11:38 AM EDT  Date of encounter:  9/30/2023  Independent Historian/Clinical History and Information was obtained by:   Patient    History is limited by: N/A    Chief Complaint: left flank pain      History of Present Illness:  Patient is a 68 y.o. year old female who presents to the emergency department for evaluation of left flank pain. She was treated for a UTI on 9/23/23 (at urgent care) and has been taking antibiotic for this.    HPI    Patient Care Team  Primary Care Provider: Tressa Schulte MD    Past Medical History:     Allergies   Allergen Reactions    Nitrofurantoin Anaphylaxis    Lisinopril Cough     Past Medical History:   Diagnosis Date    Allergies     Anxiety     Arthritis     Asymptomatic microscopic hematuria 08/07/2017    Diarrhea 11/24/2020    chronic and worsening, exact etiology is unclear at this time. worrisome features include urgency and pt having to wake upa t night to defecate. Lack of abdominal pain is reassuring. Will obtain labs and stool studies for fruther evaluation. We did discuss potential need for GI referral for colonoscopy with biopsy if symptoms persist and lab studies are unreavealing. Discussed red flag symptoms th    Edema     Fatigue 07/29/2019    Hematuria     Hyperlipidemia     Kidney stone     Osteoarthritis, hand, primary localized, unspecified laterality 02/13/2018    Primary osteoarthritis of right knee 09/24/2018    Restless leg syndrome     Seasonal allergies     Sinus trouble      Past Surgical History:   Procedure Laterality Date    TUBAL ABDOMINAL LIGATION  07/1990 1988     Family History   Problem Relation Age of Onset    Stroke Mother     Arthritis Mother     Osteoporosis Mother     Diabetes Father     Arthritis Sister     Osteoporosis Sister     Arthritis Brother        Home Medications:  Prior to Admission medications    Medication Sig Start Date End Date Taking? Authorizing Provider   aspirin 81 MG EC tablet Take 1 tablet by mouth Daily.     Kelsey Guardado MD   calcium carbonate, oyster shell, 500 MG tablet tablet TAKE ONE TABLET BY MOUTH EVERY DAY 23   Tressa Schulte MD   clindamycin (CLEOCIN T) 1 % external solution Apply TO folliculitis ON NECK AND SCALP EVERY DAY AS NEEDED ITCHING 22   Kelsey Guardado MD   diphenhydrAMINE-acetaminophen (TYLENOL PM)  MG tablet per tablet Take 1 tablet by mouth At Night As Needed for Sleep.    Kelsey Guardado MD   fexofenadine (ALLEGRA) 180 MG tablet Take 1 tablet by mouth Daily for 90 days. 22   Tressa Schulte MD   FLUoxetine (PROzac) 20 MG capsule Take 1 capsule by mouth Daily for 90 days. 7/25/23 10/23/23  Tressa Schulte MD   fluticasone (FLONASE) 50 MCG/ACT nasal spray 1 spray into the nostril(s) as directed by provider Daily. 23   Tressa Schulte MD   folic acid (FOLVITE) 1 MG tablet Take 1 tablet by mouth Daily.    Kelsey Guardado MD   ibuprofen (ADVIL,MOTRIN) 600 MG tablet Take 1 tablet by mouth Every 6 (Six) Hours As Needed for Moderate Pain . 8/3/22   Tressa Schulte MD   pramipexole (MIRAPEX) 0.5 MG tablet Take 1 tablet by mouth Daily for 90 days. 7/25/23 10/23/23  Tressa Schulte MD   Turmeric (QC TUMERIC COMPLEX PO) Take  by mouth Daily.    Kelsey Guardado MD   vitamin B-12 (CYANOCOBALAMIN) 100 MCG tablet Take 50 mcg by mouth Daily.    Kelsey Guardado MD   Vitamin D, Cholecalciferol, (CHOLECALCIFEROL) 10 MCG (400 UNIT) tablet Take 1 tablet by mouth Daily.    Kelsey Guardado MD        Social History:   Social History     Tobacco Use    Smoking status: Former     Packs/day: 1.00     Years: 3.00     Pack years: 3.00     Types: Cigarettes     Start date:      Quit date:      Years since quittin.7    Smokeless tobacco: Never   Vaping Use    Vaping Use: Never used   Substance Use Topics    Alcohol use: Yes     Comment: light 3/5/19 occasionally drinks    Drug use: Never         Review of Systems:  Review of Systems  "  Constitutional: Negative.    HENT: Negative.     Eyes: Negative.    Respiratory: Negative.     Cardiovascular: Negative.    Gastrointestinal: Negative.    Endocrine: Negative.    Genitourinary:  Positive for flank pain.   Musculoskeletal:  Positive for back pain.   Skin: Negative.    Allergic/Immunologic: Negative.    Neurological: Negative.    Hematological: Negative.    Psychiatric/Behavioral: Negative.        Physical Exam:  /81   Pulse 66   Temp 97.3 °F (36.3 °C) (Oral)   Resp 16   Ht 157.5 cm (62\")   Wt 82.9 kg (182 lb 12.2 oz)   SpO2 96%   BMI 33.43 kg/m²     Physical Exam  Constitutional:       Appearance: Normal appearance.   HENT:      Head: Normocephalic and atraumatic.      Nose: Nose normal.      Mouth/Throat:      Mouth: Mucous membranes are moist.   Eyes:      Pupils: Pupils are equal, round, and reactive to light.   Cardiovascular:      Rate and Rhythm: Normal rate and regular rhythm.      Pulses: Normal pulses.   Pulmonary:      Effort: Pulmonary effort is normal.      Breath sounds: Normal breath sounds and air entry.   Abdominal:      General: Abdomen is flat. Bowel sounds are normal.      Palpations: Abdomen is soft.   Musculoskeletal:         General: Normal range of motion.      Cervical back: Normal and normal range of motion.      Thoracic back: Spasms and tenderness present. Decreased range of motion.      Lumbar back: Normal.        Back:       Comments: Tenderness on deep palpation and deep breath   Skin:     General: Skin is warm and dry.      Capillary Refill: Capillary refill takes less than 2 seconds.   Neurological:      General: No focal deficit present.      Mental Status: She is alert and oriented to person, place, and time. Mental status is at baseline.   Psychiatric:         Mood and Affect: Mood normal.                Procedures:  Procedures      Medical Decision Making:      Comorbidities that affect care:    None    External Notes reviewed:    None      The " following orders were placed and all results were independently analyzed by me:  Orders Placed This Encounter   Procedures    CT Abdomen Pelvis Without Contrast    [place] Draw    Comprehensive Metabolic Panel    Lipase    Urinalysis With Microscopic If Indicated (No Culture) - Urine, Clean Catch    Lactic Acid, Plasma    CBC Auto Differential    Urinalysis, Microscopic Only - Urine, Clean Catch    NPO Diet NPO Type: Strict NPO    Undress & Gown    Insert Peripheral IV    CBC & Differential    Green Top (Gel)    Lavender Top    Gold Top - SST    Light Blue Top       Medications Given in the Emergency Department:  Medications   sodium chloride 0.9 % flush 10 mL (has no administration in time range)   dexAMETHasone sodium phosphate injection 10 mg (has no administration in time range)   sodium chloride 0.9 % bolus 1,000 mL (1,000 mL Intravenous New Bag 9/30/23 1310)   ketorolac (TORADOL) injection 15 mg (15 mg Intravenous Given 9/30/23 1312)   ondansetron (ZOFRAN) injection 4 mg (4 mg Intravenous Given 9/30/23 1311)        ED Course:         Labs:    Lab Results (last 24 hours)       Procedure Component Value Units Date/Time    Urinalysis With Microscopic If Indicated (No Culture) - Urine, Clean Catch [[id number]]  (Abnormal) Collected: 09/30/23 1206    Specimen: Urine, Clean Catch Updated: 09/30/23 1234     Color, UA Yellow     Appearance, UA Clear     pH, UA 5.5     Specific Gravity, UA 1.025     Glucose, UA Negative     Ketones, UA Negative     Bilirubin, UA Negative     Blood, UA Moderate (2+)     Protein, UA Negative     Leuk Esterase, UA Negative     Nitrite, UA Negative     Urobilinogen, UA 1.0 E.U./dL    Urinalysis, Microscopic Only - Urine, Clean Catch [[id number]]  (Abnormal) Collected: 09/30/23 1206    Specimen: Urine, Clean Catch Updated: 09/30/23 1234     RBC, UA 6-12 /HPF      WBC, UA 0-2 /HPF      Bacteria, UA None Seen /HPF      Squamous Epithelial Cells, UA 0-2 /HPF      Hyaline Casts, UA 0-2 /LPF       Methodology Automated Microscopy    CBC & Differential [[id number]]  (Abnormal) Collected: 09/30/23 1209    Specimen: Blood Updated: 09/30/23 1222    Narrative:      The following orders were created for panel order CBC & Differential.  Procedure                               Abnormality         Status                     ---------                               -----------         ------                     CBC Auto Differential[[id number]]        Abnormal            Final result                 Please view results for these tests on the individual orders.    Comprehensive Metabolic Panel [[id number]]  (Abnormal) Collected: 09/30/23 1209    Specimen: Blood Updated: 09/30/23 1248     Glucose 105 mg/dL      BUN 15 mg/dL      Creatinine 0.67 mg/dL      Sodium 139 mmol/L      Potassium 3.9 mmol/L      Chloride 103 mmol/L      CO2 25.5 mmol/L      Calcium 9.6 mg/dL      Total Protein 7.0 g/dL      Albumin 4.3 g/dL      ALT (SGPT) 14 U/L      AST (SGOT) 15 U/L      Alkaline Phosphatase 98 U/L      Total Bilirubin 0.3 mg/dL      Globulin 2.7 gm/dL      A/G Ratio 1.6 g/dL      BUN/Creatinine Ratio 22.4     Anion Gap 10.5 mmol/L      eGFR 95.9 mL/min/1.73     Narrative:      GFR Normal >60  Chronic Kidney Disease <60  Kidney Failure <15      Lipase [[id number]]  (Normal) Collected: 09/30/23 1209    Specimen: Blood Updated: 09/30/23 1248     Lipase 37 U/L     Lactic Acid, Plasma [[id number]]  (Normal) Collected: 09/30/23 1209    Specimen: Blood Updated: 09/30/23 1244     Lactate 1.0 mmol/L     CBC Auto Differential [[id number]]  (Abnormal) Collected: 09/30/23 1209    Specimen: Blood Updated: 09/30/23 1222     WBC 7.98 10*3/mm3      RBC 3.96 10*6/mm3      Hemoglobin 11.6 g/dL      Hematocrit 35.5 %      MCV 89.6 fL      MCH 29.3 pg      MCHC 32.7 g/dL      RDW 13.1 %      RDW-SD 43.4 fl      MPV 9.1 fL      Platelets 324 10*3/mm3      Neutrophil % 64.5 %      Lymphocyte % 20.6 %      Monocyte % 9.3 %      Eosinophil % 4.1 %       Basophil % 1.1 %      Immature Grans % 0.4 %      Neutrophils, Absolute 5.15 10*3/mm3      Lymphocytes, Absolute 1.64 10*3/mm3      Monocytes, Absolute 0.74 10*3/mm3      Eosinophils, Absolute 0.33 10*3/mm3      Basophils, Absolute 0.09 10*3/mm3      Immature Grans, Absolute 0.03 10*3/mm3      nRBC 0.0 /100 WBC              Imaging:    CT Abdomen Pelvis Without Contrast    Result Date: 9/30/2023  PROCEDURE: CT ABDOMEN PELVIS WO CONTRAST  COMPARISON: [facility], CT, CT ABD AND PELVIS W/WO CONTRAST, 10/28/2010, 7:48.  [facility], CT, CT CHEST W CONTRAST DIAGNOSTIC, 7/25/2022, 11:28.  INDICATIONS: lt Flank pain  TECHNIQUE: CT images were created without intravenous contrast.   PROTOCOL:   Standard imaging protocol performed    RADIATION:   DLP: 447mGy*cm   Automated exposure control was utilized to minimize radiation dose.  FINDINGS:  Patchy airspace disease at the lung bases may represent atelectasis or multifocal pneumonia.  The liver is of normal size.  1.7 cm cyst is seen in the left liver lobe.  The gallbladder is not abnormally distended.  No pancreatic or adrenal mass is evident.  The spleen is of normal size.  No renal or ureteral stones are seen.  Water density renal lesions are consistent with cysts, but not completely characterized.  No hydronephrosis is evident.  The urinary bladder is not abnormally distended.  The uterus measures 5 cm in greatest transverse dimension.  No pelvic mass is seen.  Bowel loops are not abnormally dilated.  The cecum is in the pelvis.  The appendix is not visualized.  Mild diverticulosis of the descending colon and sigmoid colon is evident.  Evaluation of solid organs and bowel is limited without oral and IV contrast.  The anterior abdominal wall is intact, no hernia is evident.  Degenerative changes are seen in the lower thoracic and lumbar spine.  CONTINUED ON NEXT PAGE...          CT scan of the abdomen and pelvis without contrast  demonstrating water density lesions in the liver and kidneys consistent with cysts.  No renal or ureteral stones are seen.  Mild diverticulosis of the descending colon and sigmoid colon without diverticulitis.     ANDREA OSCAR MD       Electronically Signed and Approved By: ANDREA OSCAR MD on 9/30/2023 at 12:59                Differential Diagnosis and Discussion:    Flank Pain: Differential diagnosis includes but is not limited to kidney stones, pyelonephritis, musculoskeletal disorders, renal infarction, urinary tract infection, hydronephrosis, radiculopathy, aortic aneurysm, renal cell carcinoma.    All labs were reviewed and interpreted by me.  CT scan radiology impression was interpreted by me.    MDM     Amount and/or Complexity of Data Reviewed  Clinical lab tests: reviewed  Tests in the radiology section of CPT®: reviewed             Patient Care Considerations:           Consultants/Shared Management Plan:    None    Social Determinants of Health:    Patient is independent, reliable, and has access to care.       Disposition and Care Coordination:    Discharged: The patient is suitable and stable for discharge with no need for consideration of observation or admission.    I have explained the patient´s condition, diagnoses and treatment plan based on the information available to me at this time. I have answered questions and addressed any concerns. The patient has a good  understanding of the patient´s diagnosis, condition, and treatment plan as can be expected at this point. The vital signs have been stable. The patient´s condition is stable and appropriate for discharge from the emergency department.      The patient will pursue further outpatient evaluation with the primary care physician or other designated or consulting physician as outlined in the discharge instructions. They are agreeable to this plan of care and follow-up instructions have been explained in detail. The patient has received these  instructions in written format and have expressed an understanding of the discharge instructions. The patient is aware that any significant change in condition or worsening of symptoms should prompt an immediate return to this or the closest emergency department or call to 911.  I have explained discharge medications and the need for follow up with the patient/caretakers. This was also printed in the discharge instructions. Patient was discharged with the following medications and follow up:      Medication List        New Prescriptions      ketorolac 10 MG tablet  Commonly known as: TORADOL  Take 1 tablet by mouth Every 6 (Six) Hours As Needed for Moderate Pain (start after completing prednisone course).     ondansetron ODT 4 MG disintegrating tablet  Commonly known as: ZOFRAN-ODT  Place 1 tablet on the tongue 4 (Four) Times a Day.     predniSONE 20 MG tablet  Commonly known as: DELTASONE  Take 1 tablet by mouth 3 (Three) Times a Day With Meals for 5 days.            Stop      ibuprofen 600 MG tablet  Commonly known as: ADVIL,MOTRIN               Where to Get Your Medications        These medications were sent to [id number] - [place], [place] - [address] AT [facility] - [phone]  - [phone] FX  [address] [place] [address] [place] [address]      Phone: [phone]   ketorolac 10 MG tablet  ondansetron ODT 4 MG disintegrating tablet  predniSONE 20 MG tablet      Tressa Schulte MD  [address] [address] [phone]    In 3 days  if no better       Final diagnoses:   Costochondritis, acute   Mid back pain on left side        ED Disposition       ED Disposition   Discharge    Condition   Stable    Comment   --               This medical record created using voice recognition software.             Jerri Chambers, APRN  09/30/23 [id number]

## 2023-09-30 NOTE — DISCHARGE INSTRUCTIONS
Drink plenty of fluids, mostly water  Finish the course of antibiotics you are currently taking for UTI  Start the prednisone in the morning and take tylenol as needed for pain until you complete them. Then you may take toradol for pain if needed.  Follow up with your PCP next week  Return to the ER for any worsening or new symptoms of concern

## 2023-10-31 ENCOUNTER — OFFICE VISIT (OUTPATIENT)
Dept: INTERNAL MEDICINE | Facility: CLINIC | Age: 68
End: 2023-10-31
Payer: MEDICARE

## 2023-10-31 VITALS
BODY MASS INDEX: 33.35 KG/M2 | DIASTOLIC BLOOD PRESSURE: 82 MMHG | OXYGEN SATURATION: 96 % | SYSTOLIC BLOOD PRESSURE: 120 MMHG | HEIGHT: 62 IN | TEMPERATURE: 96.7 F | HEART RATE: 92 BPM | WEIGHT: 181.25 LBS

## 2023-10-31 DIAGNOSIS — N28.1 KIDNEY CYSTS: ICD-10-CM

## 2023-10-31 DIAGNOSIS — Z00.00 MEDICARE ANNUAL WELLNESS VISIT, SUBSEQUENT: Primary | ICD-10-CM

## 2023-10-31 DIAGNOSIS — Z23 INFLUENZA VACCINE NEEDED: ICD-10-CM

## 2023-10-31 DIAGNOSIS — Z12.4 CERVICAL CANCER SCREENING: ICD-10-CM

## 2023-10-31 DIAGNOSIS — K76.89 LIVER CYST: ICD-10-CM

## 2023-10-31 DIAGNOSIS — Z76.0 MEDICATION REFILL: ICD-10-CM

## 2023-10-31 DIAGNOSIS — E78.2 MIXED HYPERLIPIDEMIA: Primary | ICD-10-CM

## 2023-10-31 PROCEDURE — 3079F DIAST BP 80-89 MM HG: CPT | Performed by: STUDENT IN AN ORGANIZED HEALTH CARE EDUCATION/TRAINING PROGRAM

## 2023-10-31 PROCEDURE — 3074F SYST BP LT 130 MM HG: CPT | Performed by: STUDENT IN AN ORGANIZED HEALTH CARE EDUCATION/TRAINING PROGRAM

## 2023-10-31 PROCEDURE — G0439 PPPS, SUBSEQ VISIT: HCPCS | Performed by: STUDENT IN AN ORGANIZED HEALTH CARE EDUCATION/TRAINING PROGRAM

## 2023-10-31 PROCEDURE — 1170F FXNL STATUS ASSESSED: CPT | Performed by: STUDENT IN AN ORGANIZED HEALTH CARE EDUCATION/TRAINING PROGRAM
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
## 2024-02-27 ENCOUNTER — CLINICAL SUPPORT (OUTPATIENT)
Dept: INTERNAL MEDICINE | Facility: CLINIC | Age: 69
End: 2024-02-27
Payer: MEDICARE

## 2024-02-27 DIAGNOSIS — Z01.89 ENCOUNTER FOR LABORATORY TEST: Primary | ICD-10-CM

## 2024-02-27 DIAGNOSIS — E78.2 MIXED HYPERLIPIDEMIA: ICD-10-CM

## 2024-02-27 LAB
ALBUMIN SERPL-MCNC: 4.4 G/DL (ref 3.5–5.2)
ALBUMIN/GLOB SERPL: 1.7 G/DL
ALP SERPL-CCNC: 92 U/L (ref 39–117)
ALT SERPL W P-5'-P-CCNC: 15 U/L (ref 1–33)
ANION GAP SERPL CALCULATED.3IONS-SCNC: 12 MMOL/L (ref 5–15)
AST SERPL-CCNC: 16 U/L (ref 1–32)
BASOPHILS # BLD AUTO: 0.11 10*3/MM3 (ref 0–0.2)
BASOPHILS NFR BLD AUTO: 1.4 % (ref 0–1.5)
BILIRUB SERPL-MCNC: 0.2 MG/DL (ref 0–1.2)
BUN SERPL-MCNC: 14 MG/DL (ref 8–23)
BUN/CREAT SERPL: 18.4 (ref 7–25)
CALCIUM SPEC-SCNC: 9.3 MG/DL (ref 8.6–10.5)
CHLORIDE SERPL-SCNC: 103 MMOL/L (ref 98–107)
CHOLEST SERPL-MCNC: 324 MG/DL (ref 0–200)
CO2 SERPL-SCNC: 24 MMOL/L (ref 22–29)
CREAT SERPL-MCNC: 0.76 MG/DL (ref 0.57–1)
DEPRECATED RDW RBC AUTO: 40.8 FL (ref 37–54)
EGFRCR SERPLBLD CKD-EPI 2021: 86 ML/MIN/1.73
EOSINOPHIL # BLD AUTO: 0.45 10*3/MM3 (ref 0–0.4)
EOSINOPHIL NFR BLD AUTO: 5.8 % (ref 0.3–6.2)
ERYTHROCYTE [DISTWIDTH] IN BLOOD BY AUTOMATED COUNT: 12.6 % (ref 12.3–15.4)
GLOBULIN UR ELPH-MCNC: 2.6 GM/DL
GLUCOSE SERPL-MCNC: 101 MG/DL (ref 65–99)
HCT VFR BLD AUTO: 35.7 % (ref 34–46.6)
HDLC SERPL-MCNC: 55 MG/DL (ref 40–60)
HGB BLD-MCNC: 12 G/DL (ref 12–15.9)
IMM GRANULOCYTES # BLD AUTO: 0.02 10*3/MM3 (ref 0–0.05)
IMM GRANULOCYTES NFR BLD AUTO: 0.3 % (ref 0–0.5)
LDLC SERPL CALC-MCNC: 190 MG/DL (ref 0–100)
LDLC/HDLC SERPL: 3.47 {RATIO}
LYMPHOCYTES # BLD AUTO: 2.18 10*3/MM3 (ref 0.7–3.1)
LYMPHOCYTES NFR BLD AUTO: 28.3 % (ref 19.6–45.3)
MCH RBC QN AUTO: 30.1 PG (ref 26.6–33)
MCHC RBC AUTO-ENTMCNC: 33.6 G/DL (ref 31.5–35.7)
MCV RBC AUTO: 89.5 FL (ref 79–97)
MONOCYTES # BLD AUTO: 0.69 10*3/MM3 (ref 0.1–0.9)
MONOCYTES NFR BLD AUTO: 8.9 % (ref 5–12)
NEUTROPHILS NFR BLD AUTO: 4.26 10*3/MM3 (ref 1.7–7)
NEUTROPHILS NFR BLD AUTO: 55.3 % (ref 42.7–76)
NRBC BLD AUTO-RTO: 0 /100 WBC (ref 0–0.2)
PLATELET # BLD AUTO: 356 10*3/MM3 (ref 140–450)
PMV BLD AUTO: 10 FL (ref 6–12)
POTASSIUM SERPL-SCNC: 3.6 MMOL/L (ref 3.5–5.2)
PROT SERPL-MCNC: 7 G/DL (ref 6–8.5)
RBC # BLD AUTO: 3.99 10*6/MM3 (ref 3.77–5.28)
SODIUM SERPL-SCNC: 139 MMOL/L (ref 136–145)
T4 FREE SERPL-MCNC: 1.08 NG/DL (ref 0.93–1.7)
TRIGL SERPL-MCNC: 391 MG/DL (ref 0–150)
TSH SERPL DL<=0.05 MIU/L-ACNC: 1.98 UIU/ML (ref 0.27–4.2)
VLDLC SERPL-MCNC: 79 MG/DL (ref 5–40)
WBC NRBC COR # BLD AUTO: 7.71 10*3/MM3 (ref 3.4–10.8)

## 2024-02-27 PROCEDURE — 80061 LIPID PANEL: CPT | Performed by: STUDENT IN AN ORGANIZED HEALTH CARE EDUCATION/TRAINING PROGRAM

## 2024-02-27 PROCEDURE — 84443 ASSAY THYROID STIM HORMONE: CPT | Performed by: STUDENT IN AN ORGANIZED HEALTH CARE EDUCATION/TRAINING PROGRAM

## 2024-02-27 PROCEDURE — 80053 COMPREHEN METABOLIC PANEL: CPT | Performed by: STUDENT IN AN ORGANIZED HEALTH CARE EDUCATION/TRAINING PROGRAM

## 2024-02-27 PROCEDURE — 36415 COLL VENOUS BLD VENIPUNCTURE: CPT | Performed by: STUDENT IN AN ORGANIZED HEALTH CARE EDUCATION/TRAINING PROGRAM

## 2024-02-27 PROCEDURE — 84439 ASSAY OF FREE THYROXINE: CPT | Performed by: STUDENT IN AN ORGANIZED HEALTH CARE EDUCATION/TRAINING PROGRAM

## 2024-02-27 PROCEDURE — 85025 COMPLETE CBC W/AUTO DIFF WBC: CPT | Performed by: STUDENT IN AN ORGANIZED HEALTH CARE EDUCATION/TRAINING PROGRAM

## 2024-02-27 NOTE — PROGRESS NOTES
Venipuncture Blood Specimen Collection  Venipuncture performed in [facility] by Nabila Xiong RN with good hemostasis. Patient tolerated the procedure well without complications.   02/27/24   Nabila Xiong RN

## 2024-03-05 DIAGNOSIS — E78.2 MIXED HYPERLIPIDEMIA: Primary | ICD-10-CM

## 2024-03-05 RX ORDER — ROSUVASTATIN CALCIUM 5 MG/1
5 TABLET, COATED ORAL DAILY
Qty: 90 TABLET | Refills: 1 | Status: SHIPPED | OUTPATIENT
Start: 2024-03-05

## 2024-04-17 ENCOUNTER — OFFICE VISIT (OUTPATIENT)
Dept: INTERNAL MEDICINE | Facility: CLINIC | Age: 69
End: 2024-04-17
Payer: MEDICARE

## 2024-04-17 VITALS
WEIGHT: 178.6 LBS | TEMPERATURE: 96.7 F | OXYGEN SATURATION: 96 % | HEART RATE: 76 BPM | BODY MASS INDEX: 32.67 KG/M2 | DIASTOLIC BLOOD PRESSURE: 76 MMHG | SYSTOLIC BLOOD PRESSURE: 140 MMHG

## 2024-04-17 DIAGNOSIS — Z76.0 MEDICATION REFILL: ICD-10-CM

## 2024-04-17 DIAGNOSIS — M85.80 OSTEOPENIA, UNSPECIFIED LOCATION: ICD-10-CM

## 2024-04-17 DIAGNOSIS — R22.42 LOCALIZED SWELLING OF LEFT FOOT: Primary | ICD-10-CM

## 2024-04-17 DIAGNOSIS — R73.9 HYPERGLYCEMIA: ICD-10-CM

## 2024-04-17 DIAGNOSIS — E78.5 HYPERLIPIDEMIA, UNSPECIFIED HYPERLIPIDEMIA TYPE: ICD-10-CM

## 2024-04-17 PROCEDURE — 3078F DIAST BP <80 MM HG: CPT | Performed by: STUDENT IN AN ORGANIZED HEALTH CARE EDUCATION/TRAINING PROGRAM

## 2024-04-17 PROCEDURE — 3077F SYST BP >= 140 MM HG: CPT | Performed by: STUDENT IN AN ORGANIZED HEALTH CARE EDUCATION/TRAINING PROGRAM

## 2024-04-17 PROCEDURE — 99214 OFFICE O/P EST MOD 30 MIN: CPT | Performed by: STUDENT IN AN ORGANIZED HEALTH CARE EDUCATION/TRAINING PROGRAM

## 2024-04-17 RX ORDER — FLUTICASONE PROPIONATE 50 MCG
1 SPRAY, SUSPENSION (ML) NASAL DAILY
Qty: 16 G | Refills: 3 | Status: SHIPPED | OUTPATIENT
Start: 2024-04-17

## 2024-04-17 RX ORDER — CALCIUM CARBONATE 500(1250)
500 TABLET ORAL DAILY
Qty: 90 TABLET | Refills: 1 | Status: SHIPPED | OUTPATIENT
Start: 2024-04-17

## 2024-04-17 RX ORDER — PRAMIPEXOLE DIHYDROCHLORIDE 0.5 MG/1
0.5 TABLET ORAL DAILY
Qty: 90 TABLET | Refills: 1 | Status: SHIPPED | OUTPATIENT
Start: 2024-04-17

## 2024-04-17 NOTE — PROGRESS NOTES
Chief Complaint  Hyperlipidemia (Follow up) and Foot Pain (Going on for months, left ankle is painful, does not appear to the right ankle. )    Subjective            Stormy Hoffman presents to [facility]  History of Present Illness    Left foot:  States she has issues with her left foot for the past 1 year . She has a area over the outer side that swells up at time. States sthe are is painful at times when she presses on it. Denies any specific recent injury or trauma.     Hyperlipidemia:  Pt was started on rosuvastain at last visit. States she is tolerating this well.   States she was on lipitor in past but experienced muscle pain past.     Patient states she started taking D-Mannose for recurrent UTI as this was suggested by a friend.       Past Medical History:   Diagnosis Date    Allergies     Anxiety     Arthritis     Asymptomatic microscopic hematuria 08/07/2017    Diarrhea 11/24/2020    chronic and worsening, exact etiology is unclear at this time. worrisome features include urgency and pt having to wake upa t night to defecate. Lack of abdominal pain is reassuring. Will obtain labs and stool studies for fruther evaluation. We did discuss potential need for GI referral for colonoscopy with biopsy if symptoms persist and lab studies are unreavealing. Discussed red flag symptoms th    Edema     Fatigue 07/29/2019    Hematuria     Hyperlipidemia     Kidney stone     Osteoarthritis, hand, primary localized, unspecified laterality 02/13/2018    Primary osteoarthritis of right knee 09/24/2018    Restless leg syndrome     Seasonal allergies     Sinus trouble        Allergies:   Allergies   Allergen Reactions    Nitrofurantoin Anaphylaxis    Lisinopril Cough          Past Surgical History:   Procedure Laterality Date    TUBAL ABDOMINAL LIGATION  07/1990 1988          Social History     Socioeconomic History    Marital status:    Tobacco Use    Smoking  status: Former     Current packs/day: 0.00     Average packs/day: 1 pack/day for 3.0 years (3.0 ttl pk-yrs)     Types: Cigarettes     Start date:      Quit date:      Years since quittin.3    Smokeless tobacco: Never   Vaping Use    Vaping status: Never Used   Substance and Sexual Activity    Alcohol use: Yes     Comment: light 3/5/19 occasionally drinks    Drug use: Never         Family History   Problem Relation Age of Onset    Stroke Mother     Arthritis Mother     Osteoporosis Mother     Diabetes Father     Arthritis Sister     Osteoporosis Sister     Arthritis Brother           Health Maintenance Due   Topic Date Due    TDAP/TD VACCINES (1 - Tdap) Never done    RSV Vaccine - Adults (1 - 1-dose 60+ series) Never done    COVID-19 Vaccine ( season) 2023            Current Outpatient Medications:     aspirin 81 MG EC tablet, Take 1 tablet by mouth Daily., Disp: , Rfl:     calcium carbonate, oyster shell, 500 MG tablet tablet, Take 1 tablet by mouth Daily., Disp: 90 tablet, Rfl: 1    clindamycin (CLEOCIN T) 1 % external solution, Apply TO folliculitis ON NECK AND SCALP EVERY DAY AS NEEDED ITCHING, Disp: , Rfl:     Cranberry 50 MG chewable tablet, Chew 50 mg Daily., Disp: , Rfl:     D-Mannose 500 MG capsule, Take 2 capsules by mouth 2 (Two) Times a Day., Disp: , Rfl:     diphenhydrAMINE-acetaminophen (TYLENOL PM)  MG tablet per tablet, Take 1 tablet by mouth At Night As Needed for Sleep., Disp: , Rfl:     fexofenadine (ALLEGRA) 180 MG tablet, Take 1 tablet by mouth Daily for 90 days., Disp: 90 tablet, Rfl: 1    fluticasone (FLONASE) 50 MCG/ACT nasal spray, 1 spray into the nostril(s) as directed by provider Daily., Disp: 16 g, Rfl: 3    hydrOXYzine (ATARAX) 25 MG tablet, Take 1 tablet by mouth At Night As Needed (sleep)., Disp: 20 tablet, Rfl: 0    pramipexole (MIRAPEX) 0.5 MG tablet, Take 1 tablet by mouth Daily., Disp: 90 tablet, Rfl: 1    rosuvastatin (Crestor) 5 MG tablet, Take  1 tablet by mouth Daily., Disp: 90 tablet, Rfl: 1    Turmeric (QC TUMERIC COMPLEX PO), Take  by mouth As Needed., Disp: , Rfl:     vitamin B-12 (CYANOCOBALAMIN) 100 MCG tablet, Take 0.5 tablets by mouth Daily., Disp: , Rfl:     Vitamin D, Cholecalciferol, (CHOLECALCIFEROL) 10 MCG (400 UNIT) tablet, Take 1 tablet by mouth Daily., Disp: , Rfl:     FLUoxetine (PROzac) 20 MG capsule, Take 1 capsule by mouth Daily for 90 days., Disp: 90 capsule, Rfl: 1      Immunization History   Administered Date(s) Administered    COVID-19 (PFIZER) Purple Cap Monovalent 03/17/2021, 04/07/2021, 12/16/2021    Flu Vaccine Quad PF >36MO 12/20/2019, 10/27/2020    Fluzone (or Fluarix & Flulaval for VFC) >6mos 10/27/2020    Fluzone High-Dose 65+yrs 10/28/2022    Influenza, Unspecified 12/20/2019, 09/15/2023    Pneumococcal Conjugate 20-Valent (PCV20) 07/25/2023    Shingrix 10/27/2020    Zoster, Unspecified 12/01/2020         Review of Systems       Objective       Vitals:    04/17/24 1603   BP: 140/76   BP Location: Left arm   Patient Position: Sitting   Cuff Size: Adult   Pulse: 76   Temp: 96.7 °F (35.9 °C)   TempSrc: Temporal   SpO2: 96%   Weight: 81 kg (178 lb 9.6 oz)     Body mass index is 32.67 kg/m².      Physical Exam  Vitals reviewed.   Constitutional:       Appearance: Normal appearance.   HENT:      Head: Normocephalic and atraumatic.      Nose: Nose normal.   Eyes:      Extraocular Movements: Extraocular movements intact.      Conjunctiva/sclera: Conjunctivae normal.   Cardiovascular:      Rate and Rhythm: Normal rate and regular rhythm.      Pulses: Normal pulses.      Heart sounds: Normal heart sounds.   Pulmonary:      Effort: Pulmonary effort is normal. No respiratory distress.      Breath sounds: Normal breath sounds.   Musculoskeletal:         General: Normal range of motion.        Feet:    Feet:      Comments: ~1cm circular swollen area noted over lateral aspect of left foot. Non-tender. No overlying redness or rash.        Skin:     General: Skin is warm and dry.   Neurological:      General: No focal deficit present.      Mental Status: She is alert and oriented to person, place, and time.      Cranial Nerves: No cranial nerve deficit.   Psychiatric:         Mood and Affect: Mood normal.         Behavior: Behavior normal.         Thought Content: Thought content normal.             Result Review :                           Assessment and Plan      Diagnoses and all orders for this visit:    1. Localized swelling of left foot (Primary)  Comments:  Subacute to chronic and persisting. Xray ordered for further eval. Discussed potential need for MRI if symptoms worsen or xray is unrevealing.  Orders:  -     XR Foot 3+ View Left (In Office)    2. Osteopenia, unspecified location  Comments:  Chronic, refilled calcium.  Orders:  -     calcium carbonate, oyster shell, 500 MG tablet tablet; Take 1 tablet by mouth Daily.  Dispense: 90 tablet; Refill: 1    3. Hyperlipidemia, unspecified hyperlipidemia type  Comments:  Chronic, pt tolerating rosuvastatin well so far. Repeat labs prior to next visit.  Assessment & Plan:       Orders:  -     TSH; Future  -     Lipid Panel; Future  -     T4, Free; Future    4. Hyperglycemia  Comments:  Mild noted on previous 2 labs. Screening for diabetes w/ A1c.  Orders:  -     Comprehensive Metabolic Panel; Future  -     CBC & Differential; Future  -     TSH; Future  -     T4, Free; Future  -     Hemoglobin A1c; Future    5. Medication refill  Comments:  Refilled meds.  Orders:  -     fluticasone (FLONASE) 50 MCG/ACT nasal spray; 1 spray into the nostril(s) as directed by provider Daily.  Dispense: 16 g; Refill: 3  -     pramipexole (MIRAPEX) 0.5 MG tablet; Take 1 tablet by mouth Daily.  Dispense: 90 tablet; Refill: 1          Orders Placed This Encounter   Procedures    XR Foot 3+ View Left (In Office)    Comprehensive Metabolic Panel    TSH    Lipid Panel    T4, Free    Hemoglobin A1c    CBC & Differential      New Medications Ordered This Visit   Medications    fluticasone (FLONASE) 50 MCG/ACT nasal spray     Si spray into the nostril(s) as directed by provider Daily.     Dispense:  16 g     Refill:  3    calcium carbonate, oyster shell, 500 MG tablet tablet     Sig: Take 1 tablet by mouth Daily.     Dispense:  90 tablet     Refill:  1    pramipexole (MIRAPEX) 0.5 MG tablet     Sig: Take 1 tablet by mouth Daily.     Dispense:  90 tablet     Refill:  1                     Follow Up     Return in about 6 months (around 10/31/2024) for Medicare Wellness.    Patient was given instructions and counseling regarding her condition or for health maintenance advice. Please see specific information pulled into the AVS if appropriate.     Tressa Schulte MD   Internal Medicine-Pediatrics

## 2024-04-23 ENCOUNTER — HOSPITAL ENCOUNTER (OUTPATIENT)
Dept: GENERAL RADIOLOGY | Facility: HOSPITAL | Age: 69
Discharge: HOME | End: 2024-04-23
Admitting: STUDENT IN AN ORGANIZED HEALTH CARE EDUCATION/TRAINING PROGRAM
Payer: MEDICARE

## 2024-04-23 PROCEDURE — 73630 X-RAY EXAM OF FOOT: CPT

## 2024-06-19 DIAGNOSIS — Z76.0 MEDICATION REFILL: ICD-10-CM

## 2024-06-19 RX ORDER — FLUOXETINE HYDROCHLORIDE 20 MG/1
20 CAPSULE ORAL DAILY
Qty: 90 CAPSULE | Refills: 1 | Status: SHIPPED | OUTPATIENT
Start: 2024-06-19

## 2024-08-07 ENCOUNTER — APPOINTMENT (OUTPATIENT)
Dept: CT IMAGING | Facility: HOSPITAL | Age: 69
End: 2024-08-07
Payer: MEDICARE

## 2024-08-07 ENCOUNTER — HOSPITAL ENCOUNTER (EMERGENCY)
Facility: HOSPITAL | Age: 69
Discharge: HOME | End: 2024-08-07
Attending: EMERGENCY MEDICINE | Admitting: EMERGENCY MEDICINE
Payer: MEDICARE

## 2024-08-07 VITALS
SYSTOLIC BLOOD PRESSURE: 152 MMHG | HEIGHT: 62 IN | HEART RATE: 86 BPM | RESPIRATION RATE: 18 BRPM | WEIGHT: 181.22 LBS | DIASTOLIC BLOOD PRESSURE: 76 MMHG | BODY MASS INDEX: 33.35 KG/M2 | OXYGEN SATURATION: 94 % | TEMPERATURE: 98.4 F

## 2024-08-07 DIAGNOSIS — N23 RENAL COLIC: ICD-10-CM

## 2024-08-07 DIAGNOSIS — R10.9 FLANK PAIN: Primary | ICD-10-CM

## 2024-08-07 LAB
ALBUMIN SERPL-MCNC: 4 G/DL (ref 3.5–5.2)
ALBUMIN/GLOB SERPL: 1.4 G/DL
ALP SERPL-CCNC: 86 U/L (ref 39–117)
ALT SERPL W P-5'-P-CCNC: 14 U/L (ref 1–33)
ANION GAP SERPL CALCULATED.3IONS-SCNC: 11.4 MMOL/L (ref 5–15)
AST SERPL-CCNC: 21 U/L (ref 1–32)
BACTERIA UR QL AUTO: ABNORMAL /HPF
BASOPHILS # BLD AUTO: 0.1 10*3/MM3 (ref 0–0.2)
BASOPHILS NFR BLD AUTO: 1.3 % (ref 0–1.5)
BILIRUB SERPL-MCNC: 0.3 MG/DL (ref 0–1.2)
BILIRUB UR QL STRIP: NEGATIVE
BUN SERPL-MCNC: 15 MG/DL (ref 8–23)
BUN/CREAT SERPL: 25 (ref 7–25)
CALCIUM SPEC-SCNC: 9.2 MG/DL (ref 8.6–10.5)
CHLORIDE SERPL-SCNC: 102 MMOL/L (ref 98–107)
CLARITY UR: CLEAR
CO2 SERPL-SCNC: 26.6 MMOL/L (ref 22–29)
COLOR UR: YELLOW
CREAT SERPL-MCNC: 0.6 MG/DL (ref 0.57–1)
D-LACTATE SERPL-SCNC: 1.2 MMOL/L (ref 0.5–2)
DEPRECATED RDW RBC AUTO: 42.5 FL (ref 37–54)
EGFRCR SERPLBLD CKD-EPI 2021: 97.9 ML/MIN/1.73
EOSINOPHIL # BLD AUTO: 0.08 10*3/MM3 (ref 0–0.4)
EOSINOPHIL NFR BLD AUTO: 1 % (ref 0.3–6.2)
ERYTHROCYTE [DISTWIDTH] IN BLOOD BY AUTOMATED COUNT: 13.2 % (ref 12.3–15.4)
GLOBULIN UR ELPH-MCNC: 2.9 GM/DL
GLUCOSE SERPL-MCNC: 106 MG/DL (ref 65–99)
GLUCOSE UR STRIP-MCNC: NEGATIVE MG/DL
HCT VFR BLD AUTO: 35.7 % (ref 34–46.6)
HGB BLD-MCNC: 11.9 G/DL (ref 12–15.9)
HGB UR QL STRIP.AUTO: ABNORMAL
HOLD SPECIMEN: NORMAL
HOLD SPECIMEN: NORMAL
HYALINE CASTS UR QL AUTO: ABNORMAL /LPF
IMM GRANULOCYTES # BLD AUTO: 0.02 10*3/MM3 (ref 0–0.05)
IMM GRANULOCYTES NFR BLD AUTO: 0.3 % (ref 0–0.5)
KETONES UR QL STRIP: NEGATIVE
LEUKOCYTE ESTERASE UR QL STRIP.AUTO: ABNORMAL
LIPASE SERPL-CCNC: 29 U/L (ref 13–60)
LYMPHOCYTES # BLD AUTO: 1.1 10*3/MM3 (ref 0.7–3.1)
LYMPHOCYTES NFR BLD AUTO: 13.8 % (ref 19.6–45.3)
MCH RBC QN AUTO: 29.5 PG (ref 26.6–33)
MCHC RBC AUTO-ENTMCNC: 33.3 G/DL (ref 31.5–35.7)
MCV RBC AUTO: 88.6 FL (ref 79–97)
MONOCYTES # BLD AUTO: 0.34 10*3/MM3 (ref 0.1–0.9)
MONOCYTES NFR BLD AUTO: 4.3 % (ref 5–12)
NEUTROPHILS NFR BLD AUTO: 6.33 10*3/MM3 (ref 1.7–7)
NEUTROPHILS NFR BLD AUTO: 79.3 % (ref 42.7–76)
NITRITE UR QL STRIP: NEGATIVE
NRBC BLD AUTO-RTO: 0 /100 WBC (ref 0–0.2)
PH UR STRIP.AUTO: 6 [PH] (ref 5–8)
PLATELET # BLD AUTO: 313 10*3/MM3 (ref 140–450)
PMV BLD AUTO: 9.8 FL (ref 6–12)
POTASSIUM SERPL-SCNC: 3.8 MMOL/L (ref 3.5–5.2)
PROT SERPL-MCNC: 6.9 G/DL (ref 6–8.5)
PROT UR QL STRIP: NEGATIVE
RBC # BLD AUTO: 4.03 10*6/MM3 (ref 3.77–5.28)
RBC # UR STRIP: ABNORMAL /HPF
REF LAB TEST METHOD: ABNORMAL
SODIUM SERPL-SCNC: 140 MMOL/L (ref 136–145)
SP GR UR STRIP: 1.02 (ref 1–1.03)
SQUAMOUS #/AREA URNS HPF: ABNORMAL /HPF
UROBILINOGEN UR QL STRIP: ABNORMAL
WBC # UR STRIP: ABNORMAL /HPF
WBC NRBC COR # BLD AUTO: 7.97 10*3/MM3 (ref 3.4–10.8)
WHOLE BLOOD HOLD COAG: NORMAL
WHOLE BLOOD HOLD SPECIMEN: NORMAL

## 2024-08-07 PROCEDURE — 83690 ASSAY OF LIPASE: CPT | Performed by: EMERGENCY MEDICINE

## 2024-08-07 PROCEDURE — 25010000002 KETOROLAC TROMETHAMINE PER 15 MG: Performed by: EMERGENCY MEDICINE

## 2024-08-07 PROCEDURE — 25810000003 SODIUM CHLORIDE 0.9 % SOLUTION: Performed by: EMERGENCY MEDICINE

## 2024-08-07 PROCEDURE — 85025 COMPLETE CBC W/AUTO DIFF WBC: CPT | Performed by: EMERGENCY MEDICINE

## 2024-08-07 PROCEDURE — 83605 ASSAY OF LACTIC ACID: CPT | Performed by: EMERGENCY MEDICINE

## 2024-08-07 PROCEDURE — 74176 CT ABD & PELVIS W/O CONTRAST: CPT

## 2024-08-07 PROCEDURE — 25010000002 ONDANSETRON PER 1 MG: Performed by: EMERGENCY MEDICINE

## 2024-08-07 PROCEDURE — 25010000002 HYDROMORPHONE 1 MG/ML SOLUTION: Performed by: EMERGENCY MEDICINE

## 2024-08-07 PROCEDURE — 99284 EMERGENCY DEPT VISIT MOD MDM: CPT

## 2024-08-07 PROCEDURE — 96375 TX/PRO/DX INJ NEW DRUG ADDON: CPT

## 2024-08-07 PROCEDURE — 80053 COMPREHEN METABOLIC PANEL: CPT | Performed by: EMERGENCY MEDICINE

## 2024-08-07 PROCEDURE — 96374 THER/PROPH/DIAG INJ IV PUSH: CPT

## 2024-08-07 PROCEDURE — 81001 URINALYSIS AUTO W/SCOPE: CPT | Performed by: EMERGENCY MEDICINE

## 2024-08-07 RX ORDER — KETOROLAC TROMETHAMINE 15 MG/ML
15 INJECTION, SOLUTION INTRAMUSCULAR; INTRAVENOUS ONCE
Status: COMPLETED | OUTPATIENT
Start: 2024-08-07 | End: 2024-08-07

## 2024-08-07 RX ORDER — OXYCODONE HYDROCHLORIDE AND ACETAMINOPHEN 5; 325 MG/1; MG/1
1 TABLET ORAL EVERY 6 HOURS PRN
Qty: 12 TABLET | Refills: 0 | Status: SHIPPED | OUTPATIENT
Start: 2024-08-07

## 2024-08-07 RX ORDER — ONDANSETRON 2 MG/ML
4 INJECTION INTRAMUSCULAR; INTRAVENOUS ONCE
Status: COMPLETED | OUTPATIENT
Start: 2024-08-07 | End: 2024-08-07

## 2024-08-07 RX ORDER — ONDANSETRON 4 MG/1
4 TABLET, ORALLY DISINTEGRATING ORAL EVERY 8 HOURS PRN
Qty: 14 TABLET | Refills: 0 | Status: SHIPPED | OUTPATIENT
Start: 2024-08-07

## 2024-08-07 RX ORDER — SODIUM CHLORIDE 0.9 % (FLUSH) 0.9 %
10 SYRINGE (ML) INJECTION AS NEEDED
Status: DISCONTINUED | OUTPATIENT
Start: 2024-08-07 | End: 2024-08-07 | Stop reason: HOSPADM

## 2024-08-07 RX ADMIN — HYDROMORPHONE HYDROCHLORIDE 1 MG: 1 INJECTION, SOLUTION INTRAMUSCULAR; INTRAVENOUS; SUBCUTANEOUS at 11:23

## 2024-08-07 RX ADMIN — ONDANSETRON 4 MG: 2 INJECTION INTRAMUSCULAR; INTRAVENOUS at 11:23

## 2024-08-07 RX ADMIN — SODIUM CHLORIDE 1000 ML: 9 INJECTION, SOLUTION INTRAVENOUS at 11:24

## 2024-08-07 RX ADMIN — KETOROLAC TROMETHAMINE 15 MG: 15 INJECTION, SOLUTION INTRAMUSCULAR; INTRAVENOUS at 11:23

## 2024-08-07 NOTE — ED PROVIDER NOTES
Time: 2:11 PM EDT  Date of encounter:  8/7/2024  Independent Historian/Clinical History and Information was obtained by:   Patient and Family    History is limited by: N/A    Chief Complaint: Flank pain      History of Present Illness:  Patient is a 69 y.o. year old female who presents to the emergency department for evaluation of right flank pain.  Patient reports sudden onset of right flank pain with radiation around to the back today.  She denies previous history of kidney stones.    HPI    Patient Care Team  Primary Care Provider: Tressa Schulte MD    Past Medical History:     Allergies   Allergen Reactions    Nitrofurantoin Anaphylaxis    Lisinopril Cough     Past Medical History:   Diagnosis Date    Allergies     Anxiety     Arthritis     Asymptomatic microscopic hematuria 08/07/2017    Diarrhea 11/24/2020    chronic and worsening, exact etiology is unclear at this time. worrisome features include urgency and pt having to wake upa t night to defecate. Lack of abdominal pain is reassuring. Will obtain labs and stool studies for fruther evaluation. We did discuss potential need for GI referral for colonoscopy with biopsy if symptoms persist and lab studies are unreavealing. Discussed red flag symptoms th    Edema     Fatigue 07/29/2019    Hematuria     Hyperlipidemia     Kidney stone     Osteoarthritis, hand, primary localized, unspecified laterality 02/13/2018    Primary osteoarthritis of right knee 09/24/2018    Restless leg syndrome     Seasonal allergies     Sinus trouble      Past Surgical History:   Procedure Laterality Date    TUBAL ABDOMINAL LIGATION  07/1990 1988     Family History   Problem Relation Age of Onset    Stroke Mother     Arthritis Mother     Osteoporosis Mother     Diabetes Father     Arthritis Sister     Osteoporosis Sister     Arthritis Brother        Home Medications:  Prior to Admission medications    Medication Sig Start Date End Date Taking? Authorizing Provider   aspirin 81 MG  EC tablet Take 1 tablet by mouth Daily.    Kelsey Guardado MD   calcium carbonate, oyster shell, 500 MG tablet tablet Take 1 tablet by mouth Daily. 4/17/24   Tressa Schulte MD   clindamycin (CLEOCIN T) 1 % external solution Apply TO folliculitis ON NECK AND SCALP EVERY DAY AS NEEDED ITCHING 9/30/22   Kelsey Guardado MD   Cranberry 50 MG chewable tablet Chew 50 mg Daily.    Kelsey Guardado MD   D-Mannose 500 MG capsule Take 2 capsules by mouth 2 (Two) Times a Day.    Kelsey Guardado MD   diphenhydrAMINE-acetaminophen (TYLENOL PM)  MG tablet per tablet Take 1 tablet by mouth At Night As Needed for Sleep.    Kelsey Guardado MD   fexofenadine (ALLEGRA) 180 MG tablet Take 1 tablet by mouth Daily for 90 days. 2/9/22   Tressa Schulte MD   FLUoxetine (PROzac) 20 MG capsule TAKE 1 CAPSULE BY MOUTH EVERY DAY 6/19/24   Tressa Schulte MD   fluticasone (FLONASE) 50 MCG/ACT nasal spray 1 spray into the nostril(s) as directed by provider Daily. 4/17/24   Tressa Schulte MD   hydrOXYzine (ATARAX) 25 MG tablet Take 1 tablet by mouth At Night As Needed (sleep). 9/30/23   Jerri Chambers APRN   ondansetron ODT (ZOFRAN-ODT) 4 MG disintegrating tablet Place 1 tablet on the tongue Every 8 (Eight) Hours As Needed for Nausea or Vomiting. 8/7/24   Deion Rios MD   oxyCODONE-acetaminophen (PERCOCET) 5-325 MG per tablet Take 1 tablet by mouth Every 6 (Six) Hours As Needed for Severe Pain. 8/7/24   Deion Rios MD   pramipexole (MIRAPEX) 0.5 MG tablet Take 1 tablet by mouth Daily. 4/17/24   Tressa Schulte MD   rosuvastatin (Crestor) 5 MG tablet Take 1 tablet by mouth Daily. 3/5/24   Tressa Schulte MD   Turmeric (QC TUMERIC COMPLEX PO) Take  by mouth As Needed.    Kelsey Guardado MD   vitamin B-12 (CYANOCOBALAMIN) 100 MCG tablet Take 0.5 tablets by mouth Daily.    Kelsey Guardado MD   Vitamin D, Cholecalciferol, (CHOLECALCIFEROL) 10 MCG (400 UNIT) tablet Take 1 tablet by  "mouth Daily.    Provider, Historical, MD        Social History:   Social History     Tobacco Use    Smoking status: Former     Current packs/day: 0.00     Average packs/day: 1 pack/day for 3.0 years (3.0 ttl pk-yrs)     Types: Cigarettes     Start date:      Quit date:      Years since quittin.6    Smokeless tobacco: Never   Vaping Use    Vaping status: Never Used   Substance Use Topics    Alcohol use: Yes     Comment: light 3/5/19 occasionally drinks    Drug use: Never         Review of Systems:  Review of Systems   Constitutional:  Negative for chills and fever.   HENT:  Negative for congestion, rhinorrhea and sore throat.    Eyes:  Negative for pain and visual disturbance.   Respiratory:  Negative for apnea, cough, chest tightness and shortness of breath.    Cardiovascular:  Negative for chest pain and palpitations.   Gastrointestinal:  Negative for abdominal pain, diarrhea, nausea and vomiting.   Genitourinary:  Positive for flank pain. Negative for difficulty urinating and dysuria.   Musculoskeletal:  Negative for joint swelling and myalgias.   Skin:  Negative for color change.   Neurological:  Negative for seizures and headaches.   Psychiatric/Behavioral: Negative.     All other systems reviewed and are negative.       Physical Exam:  /76   Pulse 76   Temp 98.2 °F (36.8 °C)   Resp 20   Ht 157.5 cm (62\")   Wt 82.2 kg (181 lb 3.5 oz)   SpO2 95%   BMI 33.15 kg/m²     Physical Exam  Vitals and nursing note reviewed.   Constitutional:       General: She is not in acute distress.     Appearance: Normal appearance. She is not toxic-appearing.   HENT:      Head: Normocephalic and atraumatic.      Jaw: There is normal jaw occlusion.   Eyes:      General: Lids are normal.      Extraocular Movements: Extraocular movements intact.      Conjunctiva/sclera: Conjunctivae normal.      Pupils: Pupils are equal, round, and reactive to light.   Cardiovascular:      Rate and Rhythm: Normal rate and " regular rhythm.      Pulses: Normal pulses.      Heart sounds: Normal heart sounds.   Pulmonary:      Effort: Pulmonary effort is normal. No respiratory distress.      Breath sounds: Normal breath sounds. No wheezing or rhonchi.   Abdominal:      General: Abdomen is flat.      Palpations: Abdomen is soft.      Tenderness: There is no abdominal tenderness. There is no guarding or rebound.   Musculoskeletal:         General: Normal range of motion.      Cervical back: Normal range of motion and neck supple.      Right lower leg: No edema.      Left lower leg: No edema.   Skin:     General: Skin is warm and dry.   Neurological:      Mental Status: She is alert and oriented to person, place, and time. Mental status is at baseline.   Psychiatric:         Mood and Affect: Mood normal.                  Procedures:  Procedures      Medical Decision Making:      Comorbidities that affect care:    Anxiety, hypertension    External Notes reviewed:    None      The following orders were placed and all results were independently analyzed by me:  Orders Placed This Encounter   Procedures    CT Abdomen Pelvis Without Contrast    [place] Draw    Comprehensive Metabolic Panel    Lipase    Urinalysis With Microscopic If Indicated (No Culture) - Urine, Clean Catch    Lactic Acid, Plasma    CBC Auto Differential    Urinalysis, Microscopic Only - Urine, Clean Catch    NPO Diet NPO Type: Strict NPO    Undress & Gown    Insert Peripheral IV    CBC & Differential    Green Top (Gel)    Lavender Top    Extra Tubes    Gold Top - SST    Light Blue Top       Medications Given in the Emergency Department:  Medications   sodium chloride 0.9 % flush 10 mL (has no administration in time range)   ketorolac (TORADOL) injection 15 mg (15 mg Intravenous Given 8/7/24 1123)   HYDROmorphone (DILAUDID) injection 1 mg (1 mg Intravenous Given 8/7/24 1123)   ondansetron (ZOFRAN) injection 4 mg (4 mg Intravenous Given 8/7/24 1123)   sodium chloride 0.9 % bolus  1,000 mL (0 mL Intravenous Stopped 8/7/24 1154)        ED Course:         Labs:    Lab Results (last 24 hours)       Procedure Component Value Units Date/Time    CBC & Differential [[id number]]  (Abnormal) Collected: 08/07/24 1102    Specimen: Blood Updated: 08/07/24 1121    Narrative:      The following orders were created for panel order CBC & Differential.  Procedure                               Abnormality         Status                     ---------                               -----------         ------                     CBC Auto Differential[[id number]]        Abnormal            Final result                 Please view results for these tests on the individual orders.    Comprehensive Metabolic Panel [[id number]]  (Abnormal) Collected: 08/07/24 1102    Specimen: Blood Updated: 08/07/24 1139     Glucose 106 mg/dL      BUN 15 mg/dL      Creatinine 0.60 mg/dL      Sodium 140 mmol/L      Potassium 3.8 mmol/L      Chloride 102 mmol/L      CO2 26.6 mmol/L      Calcium 9.2 mg/dL      Total Protein 6.9 g/dL      Albumin 4.0 g/dL      ALT (SGPT) 14 U/L      AST (SGOT) 21 U/L      Alkaline Phosphatase 86 U/L      Total Bilirubin 0.3 mg/dL      Globulin 2.9 gm/dL      A/G Ratio 1.4 g/dL      BUN/Creatinine Ratio 25.0     Anion Gap 11.4 mmol/L      eGFR 97.9 mL/min/1.73     Narrative:      GFR Normal >60  Chronic Kidney Disease <60  Kidney Failure <15      Lipase [[id number]]  (Normal) Collected: 08/07/24 1102    Specimen: Blood Updated: 08/07/24 1139     Lipase 29 U/L     Lactic Acid, Plasma [[id number]]  (Normal) Collected: 08/07/24 1102    Specimen: Blood Updated: 08/07/24 1137     Lactate 1.2 mmol/L     CBC Auto Differential [[id number]]  (Abnormal) Collected: 08/07/24 1102    Specimen: Blood Updated: 08/07/24 1121     WBC 7.97 10*3/mm3      RBC 4.03 10*6/mm3      Hemoglobin 11.9 g/dL      Hematocrit 35.7 %      MCV 88.6 fL      MCH 29.5 pg      MCHC 33.3 g/dL      RDW 13.2 %      RDW-SD 42.5 fl      MPV 9.8 fL       Platelets 313 10*3/mm3      Neutrophil % 79.3 %      Lymphocyte % 13.8 %      Monocyte % 4.3 %      Eosinophil % 1.0 %      Basophil % 1.3 %      Immature Grans % 0.3 %      Neutrophils, Absolute 6.33 10*3/mm3      Lymphocytes, Absolute 1.10 10*3/mm3      Monocytes, Absolute 0.34 10*3/mm3      Eosinophils, Absolute 0.08 10*3/mm3      Basophils, Absolute 0.10 10*3/mm3      Immature Grans, Absolute 0.02 10*3/mm3      nRBC 0.0 /100 WBC     Urinalysis With Microscopic If Indicated (No Culture) - Urine, Clean Catch [[id number]]  (Abnormal) Collected: 08/07/24 1334    Specimen: Urine, Clean Catch Updated: 08/07/24 1346     Color, UA Yellow     Appearance, UA Clear     pH, UA 6.0     Specific Gravity, UA 1.019     Glucose, UA Negative     Ketones, UA Negative     Bilirubin, UA Negative     Blood, UA Moderate (2+)     Protein, UA Negative     Leuk Esterase, UA Trace     Nitrite, UA Negative     Urobilinogen, UA 0.2 E.U./dL    Urinalysis, Microscopic Only - Urine, Clean Catch [[id number]]  (Abnormal) Collected: 08/07/24 1334    Specimen: Urine, Clean Catch Updated: 08/07/24 1346     RBC, UA 11-20 /HPF      WBC, UA 0-2 /HPF      Bacteria, UA None Seen /HPF      Squamous Epithelial Cells, UA 3-6 /HPF      Hyaline Casts, UA 0-2 /LPF      Methodology Automated Microscopy             Imaging:    CT Abdomen Pelvis Without Contrast    Result Date: 8/7/2024  CT ABDOMEN PELVIS WO CONTRAST Date of Exam: 8/7/2024 12:17 PM EDT Indication: Flank pain, kidney stone suspected right flank pain flank pain. Comparison: 9/30/2023 Technique: Axial CT images were obtained of the abdomen and pelvis without the administration of contrast. Reconstructed coronal and sagittal images were also obtained. Automated exposure control and iterative construction methods were used. Findings: Lower Chest: Calcified granuloma in the left lower lobe Organs: No renal or ureteral calculi. No hydronephrosis. Bilateral renal cysts. Cyst in the left hepatic lobe.  Spleen, pancreas, adrenal glands, gallbladder have an unremarkable noncontrast appearance Gastrointestinal: Normal appendix. Diverticula of the descending and sigmoid colon without inflammation Pelvis: Lobular uterus. Ovaries within normal limits. Urinary bladder unremarkable. No abnormal fluid collection Peritoneum/Retroperitoneum: No ascites or pneumoperitoneum. Normal caliber aorta Bones/Soft Tissues: No acute bony abnormality     1. No acute process. No urolithiasis or obstructive uropathy 2. Colonic diverticulosis 3. Uterine leiomyomas Electronically Signed: Brody Hernandez  8/7/2024 12:41 PM EDT  Workstation ID: [id number]       Differential Diagnosis and Discussion:    Abdominal Pain: Based on the patient's signs and symptoms, I considered abdominal aortic aneurysm, small bowel obstruction, pancreatitis, acute cholecystitis, acute appendecitis, peptic ulcer disease, gastritis, colitis, endocrine disorders, irritable bowel syndrome and other differential diagnosis an etiology of the patient's abdominal pain.    All labs were reviewed and interpreted by me.  CT scan radiology impression was interpreted by me.    MDM  Number of Diagnoses or Management Options  Flank pain  Renal colic  Diagnosis management comments: In summary this is a 69-year-old female who presents to the emergency department for evaluation of right flank pain, relatively sudden onset.  CBC independently reviewed and interpreted by me and shows no critical abnormalities.  CMP independently reviewed and interpreted by me and shows no critical abnormalities.  Urinalysis independently reviewed interpreted by me reveals hematuria, no infection.  CT scan of the abdomen pelvis reviewed by me is unremarkable for acute pathology.  Given patient's complaints of flank pain sudden onset and hematuria she will be treated empirically for kidney stones with oral pain control and antiemetics.  Urology follow-up recommended.  Very strict return to ER and  follow-up instructions have been provided to the patient.                   Patient Care Considerations:    CONSULT: I considered consulting urology, however no obstructive uropathy identified      Consultants/Shared Management Plan:    None    Social Determinants of Health:    Patient is independent, reliable, and has access to care.       Disposition and Care Coordination:    Discharged: I considered escalation of care by admitting this patient to the hospital, however no obstructive uropathy identified    I have explained the patient´s condition, diagnoses and treatment plan based on the information available to me at this time. I have answered questions and addressed any concerns. The patient has a good  understanding of the patient´s diagnosis, condition, and treatment plan as can be expected at this point. The vital signs have been stable. The patient´s condition is stable and appropriate for discharge from the emergency department.      The patient will pursue further outpatient evaluation with the primary care physician or other designated or consulting physician as outlined in the discharge instructions. They are agreeable to this plan of care and follow-up instructions have been explained in detail. The patient has received these instructions in written format and has expressed an understanding of the discharge instructions. The patient is aware that any significant change in condition or worsening of symptoms should prompt an immediate return to this or the closest emergency department or call to 911.  I have explained discharge medications and the need for follow up with the patient/caretakers. This was also printed in the discharge instructions. Patient was discharged with the following medications and follow up:      Medication List        New Prescriptions      ondansetron ODT 4 MG disintegrating tablet  Commonly known as: ZOFRAN-ODT  Place 1 tablet on the tongue Every 8 (Eight) Hours As Needed for  Nausea or Vomiting.     oxyCODONE-acetaminophen 5-325 MG per tablet  Commonly known as: PERCOCET  Take 1 tablet by mouth Every 6 (Six) Hours As Needed for Severe Pain.               Where to Get Your Medications        These medications were sent to [facility] [place] [address] - [phone]  - [phone] FX  [address], [facility] [address]      Phone: [phone]   ondansetron ODT 4 MG disintegrating tablet  oxyCODONE-acetaminophen 5-325 MG per tablet      Tressa Schulte MD  [address]  [facility] [address] [phone]    In 1 week      Brooks Cortez MD  [address]  [facility] [address]  [phone]             Final diagnoses:   Flank pain   Renal colic        ED Disposition       ED Disposition   Discharge    Condition   Stable    Comment   --               This medical record created using voice recognition software.             Deion Rios MD  08/07/24 5835

## 2024-08-08 ENCOUNTER — TELEPHONE (OUTPATIENT)
Dept: UROLOGY | Facility: CLINIC | Age: 69
End: 2024-08-08
Payer: MEDICARE

## 2024-08-08 NOTE — TELEPHONE ENCOUNTER
Provider: DR RODRIGUEZ    Caller: LUCIO BEAN    Relationship to Patient: SELF    Reason for Call: PT CALLED TO SCHEDULE APPT WITH DR RODRIGUEZ     PT WAS SEEN AT [facility] 8/8/24 FOR KIDNEY STONE, FLANK PAIN.    PLEASE REVIEW AND CALL PT TO SCHEDULE FU APPT.    When was the patient last seen: 10/25/21    When did it start: 08/07/24    Where is it located: BACK-RIGHT SIDE    Characteristics of symptom/severity: 8    Timing- Is it constant or intermittent: INTERMITTENT, BETTER TODAY - IF PT MOVES AROUND PAIN RETURNS    What makes it worse: MOVEMENT    What makes it better: NO    What therapies/medications have you tried: PAIN MEDS, IBUPROFIN, HEATING PAD

## 2024-08-08 NOTE — TELEPHONE ENCOUNTER
Spoke with patient and scheduled her an appt with Dr. Joseph on 10/2/24. Also spoke with Nabila Osullivan NP CT showed no stones, but did mention renal cysts. I explained to patient why she was scheduled first available. She was instructed to call back with any issues or concerns.

## 2024-08-09 ENCOUNTER — TELEPHONE (OUTPATIENT)
Dept: INTERNAL MEDICINE | Facility: CLINIC | Age: 69
End: 2024-08-09
Payer: MEDICARE

## 2024-08-09 NOTE — TELEPHONE ENCOUNTER
Caller: Stormy Hoffman    Relationship: Self    Best call back number: [phone] (home)     What medication are you requesting: ANTI INFLAMMATORY MEDICATION    What are your current symptoms: SCIATIC NERVE PAIN IN RIGHT HIP    How long have you been experiencing symptoms: A WEEK    Have you had these symptoms before:    [] Yes  [] No    Have you been treated for these symptoms before:   [] Yes  [] No    If a prescription is needed, what is your preferred pharmacy and phone number: [facility] [place] [address] [facility] [address] - [phone] [facility] [phone] FX     Additional notes: PATIENT WENT ON A TRIP LAST WEEK [address] PATIENT BELIEVES THAT IS WHAT CAUSED THE PAIN TO START. PATIENT HAS HAD TO CALL INTO WORK ALL WEEK. HER JOB INVOLVES WALKING AND STANDING A LOT. PATIENT REQUESTING MEDICATION BE CALLED IN IF POSSIBLE. PATIENT DOES NOT WANT PAIN MEDICATION, SHE WAS GIVEN PAIN MEDICATION (OXYCODONE) AT THE EMERGENCY ROOM AFTER HAVING A CT SCAN COMPLETED. PATIENT DOES NOT WANT TO TAKE THEM BECAUSE THEY ONLY MAKE HER DROWSY. PATIENT REQUESTING ANTI-INFLAMMATORY MEDICATION INSTEAD. PLEASE ADVISE.

## 2024-08-29 ENCOUNTER — OFFICE VISIT (OUTPATIENT)
Dept: INTERNAL MEDICINE | Facility: CLINIC | Age: 69
End: 2024-08-29
Payer: MEDICARE

## 2024-08-29 VITALS
OXYGEN SATURATION: 98 % | TEMPERATURE: 95.9 F | BODY MASS INDEX: 32.59 KG/M2 | HEART RATE: 94 BPM | WEIGHT: 178.2 LBS | DIASTOLIC BLOOD PRESSURE: 76 MMHG | RESPIRATION RATE: 18 BRPM | SYSTOLIC BLOOD PRESSURE: 142 MMHG

## 2024-08-29 DIAGNOSIS — E78.5 HYPERLIPIDEMIA, UNSPECIFIED HYPERLIPIDEMIA TYPE: ICD-10-CM

## 2024-08-29 DIAGNOSIS — R73.9 HYPERGLYCEMIA: ICD-10-CM

## 2024-08-29 DIAGNOSIS — R26.0 TITUBATION: ICD-10-CM

## 2024-08-29 DIAGNOSIS — G25.2 TREMOR, COARSE: Primary | ICD-10-CM

## 2024-08-29 DIAGNOSIS — R42 DYSEQUILIBRIUM: ICD-10-CM

## 2024-08-29 DIAGNOSIS — R03.0 ELEVATED BLOOD PRESSURE READING: ICD-10-CM

## 2024-08-29 LAB
ALBUMIN SERPL-MCNC: 4.3 G/DL (ref 3.5–5.2)
ALBUMIN/GLOB SERPL: 1.4 G/DL
ALP SERPL-CCNC: 87 U/L (ref 39–117)
ALT SERPL W P-5'-P-CCNC: 17 U/L (ref 1–33)
ANION GAP SERPL CALCULATED.3IONS-SCNC: 9.4 MMOL/L (ref 5–15)
AST SERPL-CCNC: 20 U/L (ref 1–32)
BASOPHILS # BLD AUTO: 0.08 10*3/MM3 (ref 0–0.2)
BASOPHILS NFR BLD AUTO: 1.2 % (ref 0–1.5)
BILIRUB SERPL-MCNC: 0.4 MG/DL (ref 0–1.2)
BUN SERPL-MCNC: 14 MG/DL (ref 8–23)
BUN/CREAT SERPL: 19.4 (ref 7–25)
CALCIUM SPEC-SCNC: 9.8 MG/DL (ref 8.6–10.5)
CHLORIDE SERPL-SCNC: 104 MMOL/L (ref 98–107)
CHOLEST SERPL-MCNC: 206 MG/DL (ref 0–200)
CO2 SERPL-SCNC: 26.6 MMOL/L (ref 22–29)
CREAT SERPL-MCNC: 0.72 MG/DL (ref 0.57–1)
DEPRECATED RDW RBC AUTO: 40 FL (ref 37–54)
EGFRCR SERPLBLD CKD-EPI 2021: 91.2 ML/MIN/1.73
EOSINOPHIL # BLD AUTO: 0.43 10*3/MM3 (ref 0–0.4)
EOSINOPHIL NFR BLD AUTO: 6.6 % (ref 0.3–6.2)
ERYTHROCYTE [DISTWIDTH] IN BLOOD BY AUTOMATED COUNT: 12.3 % (ref 12.3–15.4)
GLOBULIN UR ELPH-MCNC: 3 GM/DL
GLUCOSE SERPL-MCNC: 99 MG/DL (ref 65–99)
HBA1C MFR BLD: 5.9 % (ref 4.8–5.6)
HCT VFR BLD AUTO: 38.9 % (ref 34–46.6)
HDLC SERPL-MCNC: 62 MG/DL (ref 40–60)
HGB BLD-MCNC: 12.8 G/DL (ref 12–15.9)
IMM GRANULOCYTES # BLD AUTO: 0.02 10*3/MM3 (ref 0–0.05)
IMM GRANULOCYTES NFR BLD AUTO: 0.3 % (ref 0–0.5)
LDLC SERPL CALC-MCNC: 109 MG/DL (ref 0–100)
LDLC/HDLC SERPL: 1.65 {RATIO}
LYMPHOCYTES # BLD AUTO: 1.77 10*3/MM3 (ref 0.7–3.1)
LYMPHOCYTES NFR BLD AUTO: 27.2 % (ref 19.6–45.3)
MCH RBC QN AUTO: 29.6 PG (ref 26.6–33)
MCHC RBC AUTO-ENTMCNC: 32.9 G/DL (ref 31.5–35.7)
MCV RBC AUTO: 89.8 FL (ref 79–97)
MONOCYTES # BLD AUTO: 0.52 10*3/MM3 (ref 0.1–0.9)
MONOCYTES NFR BLD AUTO: 8 % (ref 5–12)
NEUTROPHILS NFR BLD AUTO: 3.69 10*3/MM3 (ref 1.7–7)
NEUTROPHILS NFR BLD AUTO: 56.7 % (ref 42.7–76)
NRBC BLD AUTO-RTO: 0 /100 WBC (ref 0–0.2)
PLATELET # BLD AUTO: 319 10*3/MM3 (ref 140–450)
PMV BLD AUTO: 9.9 FL (ref 6–12)
POTASSIUM SERPL-SCNC: 4.1 MMOL/L (ref 3.5–5.2)
PROT SERPL-MCNC: 7.3 G/DL (ref 6–8.5)
RBC # BLD AUTO: 4.33 10*6/MM3 (ref 3.77–5.28)
SODIUM SERPL-SCNC: 140 MMOL/L (ref 136–145)
T4 FREE SERPL-MCNC: 1.1 NG/DL (ref 0.92–1.68)
TRIGL SERPL-MCNC: 207 MG/DL (ref 0–150)
TSH SERPL DL<=0.05 MIU/L-ACNC: 1.62 UIU/ML (ref 0.27–4.2)
VLDLC SERPL-MCNC: 35 MG/DL (ref 5–40)
WBC NRBC COR # BLD AUTO: 6.51 10*3/MM3 (ref 3.4–10.8)

## 2024-08-29 PROCEDURE — 99214 OFFICE O/P EST MOD 30 MIN: CPT | Performed by: STUDENT IN AN ORGANIZED HEALTH CARE EDUCATION/TRAINING PROGRAM

## 2024-08-29 PROCEDURE — 85025 COMPLETE CBC W/AUTO DIFF WBC: CPT | Performed by: STUDENT IN AN ORGANIZED HEALTH CARE EDUCATION/TRAINING PROGRAM

## 2024-08-29 PROCEDURE — 80053 COMPREHEN METABOLIC PANEL: CPT | Performed by: STUDENT IN AN ORGANIZED HEALTH CARE EDUCATION/TRAINING PROGRAM

## 2024-08-29 PROCEDURE — 3077F SYST BP >= 140 MM HG: CPT | Performed by: STUDENT IN AN ORGANIZED HEALTH CARE EDUCATION/TRAINING PROGRAM

## 2024-08-29 PROCEDURE — 84443 ASSAY THYROID STIM HORMONE: CPT | Performed by: STUDENT IN AN ORGANIZED HEALTH CARE EDUCATION/TRAINING PROGRAM

## 2024-08-29 PROCEDURE — 3078F DIAST BP <80 MM HG: CPT | Performed by: STUDENT IN AN ORGANIZED HEALTH CARE EDUCATION/TRAINING PROGRAM

## 2024-08-29 PROCEDURE — 80061 LIPID PANEL: CPT | Performed by: STUDENT IN AN ORGANIZED HEALTH CARE EDUCATION/TRAINING PROGRAM

## 2024-08-29 PROCEDURE — 36415 COLL VENOUS BLD VENIPUNCTURE: CPT | Performed by: STUDENT IN AN ORGANIZED HEALTH CARE EDUCATION/TRAINING PROGRAM

## 2024-08-29 PROCEDURE — 84439 ASSAY OF FREE THYROXINE: CPT | Performed by: STUDENT IN AN ORGANIZED HEALTH CARE EDUCATION/TRAINING PROGRAM

## 2024-08-29 PROCEDURE — 83036 HEMOGLOBIN GLYCOSYLATED A1C: CPT | Performed by: STUDENT IN AN ORGANIZED HEALTH CARE EDUCATION/TRAINING PROGRAM

## 2024-08-29 RX ORDER — PROPRANOLOL HYDROCHLORIDE 20 MG/1
20 TABLET ORAL 3 TIMES DAILY
Qty: 90 TABLET | Refills: 1 | Status: SHIPPED | OUTPATIENT
Start: 2024-08-29

## 2024-08-29 RX ORDER — ROSUVASTATIN CALCIUM 5 MG/1
5 TABLET, COATED ORAL DAILY
Qty: 90 TABLET | Refills: 1 | Status: SHIPPED | OUTPATIENT
Start: 2024-08-29

## 2024-08-29 NOTE — PROGRESS NOTES
Chief Complaint  Tremors (Notes she has tremors in hands, but seems to be moving into neck and face. Bothering her, wondering id there is something she can do to help. )    Subjective            Stormy Hoffman presents to [facility]  History of Present Illness    Tremors:  Last discussed in April 2024. Pt was referred to neurology and was dx w/ essential tremors.   Today pt states her daughter and  have continued to notice her tremors and encouraged her to come in to discuss tx options. States her hairdresser has also noticed her head shaking and shared w/ her that the hairdresser had a client w/ similar symptoms which improved w/ pt getting botox.     Patient would like to discuss tx options.     Patient also with concern for her balance.   States in her group of friends she is the one more likely to fall. States near falls are typically mechanical in nature.   States most recently on her annual girl's trip , she experienced worserning low back and hip pain, after walking on cobblestone streets. States she tend to be clumsy.   States this is not new, ongoing for at least 1 year.     Elevated BP:  Pt states bp at home ranges from 130-140 at home.   States she has never been on bp meds.   Her father had Hypertension .   She denies having any symptoms related to Hypertension, denies chest pain, however states she sometimes catching herself taking deeper breaths when she is active I.e. going up stairs.       Past Medical History:   Diagnosis Date    Allergies     Anxiety     Arthritis     Asymptomatic microscopic hematuria 08/07/2017    Diarrhea 11/24/2020    chronic and worsening, exact etiology is unclear at this time. worrisome features include urgency and pt having to wake upa t night to defecate. Lack of abdominal pain is reassuring. Will obtain labs and stool studies for fruther evaluation. We did discuss potential need for GI referral for colonoscopy with  biopsy if symptoms persist and lab studies are unreavealing. Discussed red flag symptoms th    Edema     Fatigue 2019    Hematuria     Hyperlipidemia     Kidney stone     Osteoarthritis, hand, primary localized, unspecified laterality 2018    Primary osteoarthritis of right knee 2018    Restless leg syndrome     Seasonal allergies     Sinus trouble        Allergies:   Allergies   Allergen Reactions    Nitrofurantoin Anaphylaxis    Lisinopril Cough          Past Surgical History:   Procedure Laterality Date    TUBAL ABDOMINAL LIGATION  1990          Social History     Socioeconomic History    Marital status:    Tobacco Use    Smoking status: Former     Current packs/day: 0.00     Average packs/day: 1 pack/day for 3.0 years (3.0 ttl pk-yrs)     Types: Cigarettes     Start date:      Quit date:      Years since quittin.6    Smokeless tobacco: Never   Vaping Use    Vaping status: Never Used   Substance and Sexual Activity    Alcohol use: Yes     Comment: light 3/5/19 occasionally drinks    Drug use: Never         Family History   Problem Relation Age of Onset    Stroke Mother     Arthritis Mother     Osteoporosis Mother     Diabetes Father     Arthritis Sister     Osteoporosis Sister     Arthritis Brother           Health Maintenance Due   Topic Date Due    TDAP/TD VACCINES (1 - Tdap) Never done    COVID-19 Vaccine ( -  season) 2023    INFLUENZA VACCINE  2024    ANNUAL WELLNESS VISIT  10/31/2024            Current Outpatient Medications:     aspirin 81 MG EC tablet, Take 1 tablet by mouth Daily., Disp: , Rfl:     calcium carbonate, oyster shell, 500 MG tablet tablet, Take 1 tablet by mouth Daily., Disp: 90 tablet, Rfl: 1    clindamycin (CLEOCIN T) 1 % external solution, Apply TO folliculitis ON NECK AND SCALP EVERY DAY AS NEEDED ITCHING, Disp: , Rfl:     Cranberry 50 MG chewable tablet, Chew 50 mg Daily., Disp: , Rfl:     D-Mannose 500 MG capsule, Take  2 capsules by mouth 2 (Two) Times a Day., Disp: , Rfl:     diphenhydrAMINE-acetaminophen (TYLENOL PM)  MG tablet per tablet, Take 1 tablet by mouth At Night As Needed for Sleep., Disp: , Rfl:     fexofenadine (ALLEGRA) 180 MG tablet, Take 1 tablet by mouth Daily for 90 days., Disp: 90 tablet, Rfl: 1    FLUoxetine (PROzac) 20 MG capsule, TAKE 1 CAPSULE BY MOUTH EVERY DAY, Disp: 90 capsule, Rfl: 1    fluticasone (FLONASE) 50 MCG/ACT nasal spray, 1 spray into the nostril(s) as directed by provider Daily., Disp: 16 g, Rfl: 3    hydrOXYzine (ATARAX) 25 MG tablet, Take 1 tablet by mouth At Night As Needed (sleep)., Disp: 20 tablet, Rfl: 0    ondansetron ODT (ZOFRAN-ODT) 4 MG disintegrating tablet, Place 1 tablet on the tongue Every 8 (Eight) Hours As Needed for Nausea or Vomiting. (Patient taking differently: Place 1 tablet on the tongue As Needed for Nausea or Vomiting.), Disp: 14 tablet, Rfl: 0    oxyCODONE-acetaminophen (PERCOCET) 5-325 MG per tablet, Take 1 tablet by mouth Every 6 (Six) Hours As Needed for Severe Pain., Disp: 12 tablet, Rfl: 0    pramipexole (MIRAPEX) 0.5 MG tablet, Take 1 tablet by mouth Daily., Disp: 90 tablet, Rfl: 1    rosuvastatin (Crestor) 5 MG tablet, Take 1 tablet by mouth Daily., Disp: 90 tablet, Rfl: 1    Turmeric (QC TUMERIC COMPLEX PO), Take  by mouth As Needed., Disp: , Rfl:     vitamin B-12 (CYANOCOBALAMIN) 100 MCG tablet, Take 0.5 tablets by mouth Daily., Disp: , Rfl:     Vitamin D, Cholecalciferol, (CHOLECALCIFEROL) 10 MCG (400 UNIT) tablet, Take 1 tablet by mouth Daily., Disp: , Rfl:     propranolol (INDERAL) 20 MG tablet, Take 1 tablet by mouth 3 (Three) Times a Day., Disp: 90 tablet, Rfl: 1      Immunization History   Administered Date(s) Administered    COVID-19 (PFIZER) Purple Cap Monovalent 03/17/2021, 04/07/2021, 12/16/2021    Flu Vaccine Quad PF >36MO 12/20/2019, 10/27/2020    Fluzone (or Fluarix & Flulaval for VFC) >6mos 10/27/2020    Fluzone High-Dose 65+yrs 10/28/2022     Influenza, Unspecified 12/20/2019, 09/15/2023    Pneumococcal Conjugate 20-Valent (PCV20) 07/25/2023    Shingrix 10/27/2020    Zoster, Unspecified 12/01/2020         Review of Systems       Objective       Vitals:    08/29/24 0841   BP: 142/76   BP Location: Right arm   Patient Position: Sitting   Cuff Size: Adult   Pulse: 94   Resp: 18   Temp: 95.9 °F (35.5 °C)   TempSrc: Temporal   SpO2: 98%   Weight: 80.8 kg (178 lb 3.2 oz)     Body mass index is 32.59 kg/m².      Physical Exam  Vitals reviewed.   Constitutional:       Appearance: Normal appearance.   HENT:      Head: Normocephalic and atraumatic.      Nose: Nose normal.   Eyes:      Extraocular Movements: Extraocular movements intact.      Conjunctiva/sclera: Conjunctivae normal.   Cardiovascular:      Rate and Rhythm: Normal rate and regular rhythm.      Pulses: Normal pulses.      Heart sounds: Normal heart sounds.   Pulmonary:      Effort: Pulmonary effort is normal. No respiratory distress.      Breath sounds: Normal breath sounds.   Musculoskeletal:         General: No tenderness or deformity. Normal range of motion.   Skin:     General: Skin is warm and dry.   Neurological:      General: No focal deficit present.      Mental Status: She is alert and oriented to person, place, and time.      Cranial Nerves: No cranial nerve deficit.      Motor: Motor function is intact.      Coordination: Coordination is intact. Finger-Nose-Finger Test and Heel to Shin Test normal.      Gait: Gait is intact.      Comments: Head titubation and noticeable lip tremor noted.    Psychiatric:         Mood and Affect: Mood normal.         Behavior: Behavior normal.         Thought Content: Thought content normal.             Result Review :                           Assessment and Plan      Diagnoses and all orders for this visit:    1. Tremor, coarse (Primary)  Comments:  Chronic w/ interval worsening. Concern for essential tremor. Starting propanolol w/ plan for close f/u in  4-6 wks. discussed potential need for MRI given report of intermittent dysequilibrium.    2. Dysequilibrium  Comments:  Chronic, intermittent and mild.    3. Elevated blood pressure reading  Comments:  Chronic w/ concern for active HTN. Pt to check bp at home and keep log to bring for review at f/u. Propanol likely will benefit her bp.    4. Titubation  Comments:  chronic w/ interval worsening. Concern for possible essential tremor. see plan above.    5. Hyperglycemia  Comments:  Mild noted on previous 2 labs. Screening for diabetes w/ A1c.  Orders:  -     Comprehensive Metabolic Panel  -     CBC & Differential  -     TSH  -     T4, Free  -     Hemoglobin A1c    6. Hyperlipidemia, unspecified hyperlipidemia type  Comments:  Chronic, pt tolerating rosuvastatin well so far. Repeat labs collected today.  Orders:  -     TSH  -     Lipid Panel  -     T4, Free    Other orders  -     propranolol (INDERAL) 20 MG tablet; Take 1 tablet by mouth 3 (Three) Times a Day.  Dispense: 90 tablet; Refill: 1          Orders Placed This Encounter   Procedures    CBC Auto Differential     New Medications Ordered This Visit   Medications    propranolol (INDERAL) 20 MG tablet     Sig: Take 1 tablet by mouth 3 (Three) Times a Day.     Dispense:  90 tablet     Refill:  1             Follow Up     Return for Next scheduled follow up.    Patient was given instructions and counseling regarding her condition or for health maintenance advice. Please see specific information pulled into the AVS if appropriate.     Tressa Schulte MD   Internal Medicine-Pediatrics

## 2024-09-26 DIAGNOSIS — Z76.0 MEDICATION REFILL: ICD-10-CM

## 2024-10-01 ENCOUNTER — TELEPHONE (OUTPATIENT)
Dept: UROLOGY | Facility: CLINIC | Age: 69
End: 2024-10-01
Payer: MEDICARE

## 2024-10-16 DIAGNOSIS — Z76.0 MEDICATION REFILL: ICD-10-CM

## 2024-10-16 RX ORDER — PRAMIPEXOLE DIHYDROCHLORIDE 0.5 MG/1
0.5 TABLET ORAL DAILY
Qty: 90 TABLET | Refills: 1 | Status: SHIPPED | OUTPATIENT
Start: 2024-10-16

## 2024-10-21 RX ORDER — PROPRANOLOL HCL 20 MG
20 TABLET ORAL 3 TIMES DAILY
Qty: 90 TABLET | Refills: 1 | Status: SHIPPED | OUTPATIENT
Start: 2024-10-21

## 2024-10-24 DIAGNOSIS — Z76.0 MEDICATION REFILL: ICD-10-CM

## 2024-10-24 RX ORDER — PRAMIPEXOLE DIHYDROCHLORIDE 0.5 MG/1
0.5 TABLET ORAL DAILY
Qty: 90 TABLET | Refills: 1 | OUTPATIENT
Start: 2024-10-24

## 2024-11-01 ENCOUNTER — OFFICE VISIT (OUTPATIENT)
Dept: INTERNAL MEDICINE | Facility: CLINIC | Age: 69
End: 2024-11-01
Payer: MEDICARE

## 2024-11-01 VITALS
SYSTOLIC BLOOD PRESSURE: 128 MMHG | HEIGHT: 62 IN | WEIGHT: 180 LBS | OXYGEN SATURATION: 98 % | HEART RATE: 78 BPM | DIASTOLIC BLOOD PRESSURE: 64 MMHG | BODY MASS INDEX: 33.13 KG/M2 | TEMPERATURE: 98.2 F

## 2024-11-01 DIAGNOSIS — Z23 NEED FOR INFLUENZA VACCINATION: ICD-10-CM

## 2024-11-01 DIAGNOSIS — G89.29 CHRONIC PAIN OF BOTH ANKLES: ICD-10-CM

## 2024-11-01 DIAGNOSIS — M25.562 PAIN IN BOTH KNEES, UNSPECIFIED CHRONICITY: ICD-10-CM

## 2024-11-01 DIAGNOSIS — G25.0 ESSENTIAL TREMOR: ICD-10-CM

## 2024-11-01 DIAGNOSIS — M25.571 CHRONIC PAIN OF BOTH ANKLES: ICD-10-CM

## 2024-11-01 DIAGNOSIS — I10 PRIMARY HYPERTENSION: ICD-10-CM

## 2024-11-01 DIAGNOSIS — M25.561 PAIN IN BOTH KNEES, UNSPECIFIED CHRONICITY: ICD-10-CM

## 2024-11-01 DIAGNOSIS — M25.572 CHRONIC PAIN OF BOTH ANKLES: ICD-10-CM

## 2024-11-01 DIAGNOSIS — Z00.00 MEDICARE ANNUAL WELLNESS VISIT, SUBSEQUENT: Primary | ICD-10-CM

## 2024-11-01 PROCEDURE — 3078F DIAST BP <80 MM HG: CPT | Performed by: STUDENT IN AN ORGANIZED HEALTH CARE EDUCATION/TRAINING PROGRAM

## 2024-11-01 PROCEDURE — 1125F AMNT PAIN NOTED PAIN PRSNT: CPT | Performed by: STUDENT IN AN ORGANIZED HEALTH CARE EDUCATION/TRAINING PROGRAM

## 2024-11-01 PROCEDURE — G0008 ADMIN INFLUENZA VIRUS VAC: HCPCS | Performed by: STUDENT IN AN ORGANIZED HEALTH CARE EDUCATION/TRAINING PROGRAM

## 2024-11-01 PROCEDURE — 99214 OFFICE O/P EST MOD 30 MIN: CPT | Performed by: STUDENT IN AN ORGANIZED HEALTH CARE EDUCATION/TRAINING PROGRAM

## 2024-11-01 PROCEDURE — G0439 PPPS, SUBSEQ VISIT: HCPCS | Performed by: STUDENT IN AN ORGANIZED HEALTH CARE EDUCATION/TRAINING PROGRAM

## 2024-11-01 PROCEDURE — 90662 IIV NO PRSV INCREASED AG IM: CPT | Performed by: STUDENT IN AN ORGANIZED HEALTH CARE EDUCATION/TRAINING PROGRAM

## 2024-11-01 PROCEDURE — 3074F SYST BP LT 130 MM HG: CPT | Performed by: STUDENT IN AN ORGANIZED HEALTH CARE EDUCATION/TRAINING PROGRAM

## 2024-11-01 RX ORDER — PROPRANOLOL HYDROCHLORIDE 60 MG/1
60 CAPSULE, EXTENDED RELEASE ORAL DAILY
Qty: 30 CAPSULE | Refills: 2 | Status: SHIPPED | OUTPATIENT
Start: 2024-11-01
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
## 2024-12-04 ENCOUNTER — OFFICE VISIT (OUTPATIENT)
Dept: INTERNAL MEDICINE | Facility: CLINIC | Age: 69
End: 2024-12-04
Payer: MEDICARE

## 2024-12-04 VITALS
BODY MASS INDEX: 33.8 KG/M2 | TEMPERATURE: 97.2 F | SYSTOLIC BLOOD PRESSURE: 128 MMHG | OXYGEN SATURATION: 97 % | HEART RATE: 69 BPM | DIASTOLIC BLOOD PRESSURE: 74 MMHG | WEIGHT: 184.8 LBS | RESPIRATION RATE: 18 BRPM

## 2024-12-04 DIAGNOSIS — R09.81 NASAL CONGESTION: ICD-10-CM

## 2024-12-04 DIAGNOSIS — G25.0 ESSENTIAL TREMOR: Primary | ICD-10-CM

## 2024-12-04 DIAGNOSIS — R05.9 COUGH, UNSPECIFIED TYPE: ICD-10-CM

## 2024-12-04 RX ORDER — IPRATROPIUM BROMIDE 42 UG/1
2 SPRAY, METERED NASAL 3 TIMES DAILY
Qty: 15 ML | Refills: 12 | Status: SHIPPED | OUTPATIENT
Start: 2024-12-04

## 2024-12-04 RX ORDER — PROPRANOLOL HYDROCHLORIDE 80 MG/1
80 CAPSULE, EXTENDED RELEASE ORAL DAILY
Qty: 30 CAPSULE | Refills: 1 | Status: SHIPPED | OUTPATIENT
Start: 2024-12-04

## 2024-12-04 NOTE — PROGRESS NOTES
Chief Complaint  essential tremor (Follow up, notes propanolol is not helping ), Nasal Congestion (Notes she has been dealing with this for a few months along with a cough), and Cough    Subjective            Stormy Hoffman presents to [facility]  History of Present Illness      Essential tremor:   Chronic, pt propanol was increased to 60mg at last visit.   States she can still feel the tremors at time and her  is still noticing it.   States she tries to be consistent at taking it but sometimes she does forget to take.     Tremors more prominent in her hands, face particularly mouth.     Nasal congestion:  Cough:  Chronic for past several months. Cough is worse at night.   She is using flonase and mucinex.        Past Medical History:   Diagnosis Date    Allergies     Anxiety     Arthritis     Asymptomatic microscopic hematuria 08/07/2017    Diarrhea 11/24/2020    chronic and worsening, exact etiology is unclear at this time. worrisome features include urgency and pt having to wake upa t night to defecate. Lack of abdominal pain is reassuring. Will obtain labs and stool studies for fruther evaluation. We did discuss potential need for GI referral for colonoscopy with biopsy if symptoms persist and lab studies are unreavealing. Discussed red flag symptoms th    Edema     Fatigue 07/29/2019    Hematuria     Hyperlipidemia     Kidney stone     Osteoarthritis, hand, primary localized, unspecified laterality 02/13/2018    Primary osteoarthritis of right knee 09/24/2018    Restless leg syndrome     Seasonal allergies     Sinus trouble        Allergies:   Allergies   Allergen Reactions    Nitrofurantoin Anaphylaxis    Lisinopril Cough          Past Surgical History:   Procedure Laterality Date    TUBAL ABDOMINAL LIGATION  07/1990 1988          Social History     Socioeconomic History    Marital status:    Tobacco Use    Smoking status: Former     Current  packs/day: 0.00     Average packs/day: 1 pack/day for 3.0 years (3.0 ttl pk-yrs)     Types: Cigarettes     Start date:      Quit date:      Years since quittin.0    Smokeless tobacco: Never   Vaping Use    Vaping status: Never Used   Substance and Sexual Activity    Alcohol use: Yes     Comment: light 3/5/19 occasionally drinks    Drug use: Never    Sexual activity: Defer         Family History   Problem Relation Age of Onset    Stroke Mother     Arthritis Mother     Osteoporosis Mother     Diabetes Father     Arthritis Sister     Osteoporosis Sister     Arthritis Brother           Health Maintenance Due   Topic Date Due    TDAP/TD VACCINES (1 - Tdap) Never done    COVID-19 Vaccine ( season) 2024    MAMMOGRAM  2025            Current Outpatient Medications:     aspirin 81 MG EC tablet, Take 1 tablet by mouth Daily., Disp: , Rfl:     calcium carbonate, oyster shell, 500 MG tablet tablet, Take 1 tablet by mouth Daily., Disp: 90 tablet, Rfl: 1    Cranberry 50 MG chewable tablet, Chew 50 mg Daily., Disp: , Rfl:     D-Mannose 500 MG capsule, Take 2 capsules by mouth 2 (Two) Times a Day., Disp: , Rfl:     diphenhydrAMINE-acetaminophen (TYLENOL PM)  MG tablet per tablet, Take 1 tablet by mouth At Night As Needed for Sleep., Disp: , Rfl:     fexofenadine (ALLEGRA) 180 MG tablet, Take 1 tablet by mouth Daily for 90 days., Disp: 90 tablet, Rfl: 1    FLUoxetine (PROzac) 20 MG capsule, TAKE 1 CAPSULE BY MOUTH EVERY DAY, Disp: 90 capsule, Rfl: 1    fluticasone (FLONASE) 50 MCG/ACT nasal spray, 1 spray into the nostril(s) as directed by provider Daily., Disp: 16 g, Rfl: 3    hydrOXYzine (ATARAX) 25 MG tablet, Take 1 tablet by mouth At Night As Needed (sleep)., Disp: 20 tablet, Rfl: 0    ondansetron ODT (ZOFRAN-ODT) 4 MG disintegrating tablet, Place 1 tablet on the tongue Every 8 (Eight) Hours As Needed for Nausea or Vomiting. (Patient taking differently: Place 1 tablet on the tongue As  Needed for Nausea or Vomiting.), Disp: 14 tablet, Rfl: 0    oxyCODONE-acetaminophen (PERCOCET) 5-325 MG per tablet, Take 1 tablet by mouth Every 6 (Six) Hours As Needed for Severe Pain., Disp: 12 tablet, Rfl: 0    pramipexole (MIRAPEX) 0.5 MG tablet, TAKE ONE TABLET BY MOUTH EVERY DAY, Disp: 90 tablet, Rfl: 1    rosuvastatin (CRESTOR) 5 MG tablet, TAKE ONE TABLET BY MOUTH EVERY DAY, Disp: 90 tablet, Rfl: 1    Turmeric (QC TUMERIC COMPLEX PO), Take  by mouth As Needed., Disp: , Rfl:     vitamin B-12 (CYANOCOBALAMIN) 100 MCG tablet, Take 0.5 tablets by mouth Daily., Disp: , Rfl:     Vitamin D, Cholecalciferol, (CHOLECALCIFEROL) 10 MCG (400 UNIT) tablet, Take 1 tablet by mouth Daily., Disp: , Rfl:     clindamycin (CLEOCIN T) 1 % external solution, Apply TO folliculitis ON NECK AND SCALP EVERY DAY AS NEEDED ITCHING (Patient not taking: Reported on 12/4/2024), Disp: , Rfl:     ipratropium (ATROVENT) 0.06 % nasal spray, Administer 2 sprays into the nostril(s) as directed by provider 3 (Three) Times a Day., Disp: 15 mL, Rfl: 12    propranolol LA (INDERAL LA) 80 MG 24 hr capsule, Take 1 capsule by mouth Daily., Disp: 30 capsule, Rfl: 1      Immunization History   Administered Date(s) Administered    COVID-19 (PFIZER) Purple Cap Monovalent 03/17/2021, 04/07/2021, 12/16/2021    Flu Vaccine Quad PF >36MO 12/20/2019, 10/27/2020    Fluzone (or Fluarix & Flulaval for VFC) >6mos 10/27/2020    Fluzone High-Dose 65+YRS 11/01/2024    Fluzone High-Dose 65+yrs 10/28/2022    Influenza, Unspecified 12/20/2019, 09/15/2023    Pneumococcal Conjugate 20-Valent (PCV20) 07/25/2023    Shingrix 10/27/2020    Zoster, Unspecified 12/01/2020         Review of Systems       Objective       Vitals:    12/04/24 1535   BP: 128/74   BP Location: Left arm   Patient Position: Sitting   Cuff Size: Adult   Pulse: 69   Resp: 18   Temp: 97.2 °F (36.2 °C)   TempSrc: Temporal   SpO2: 97%   Weight: 83.8 kg (184 lb 12.8 oz)       Physical Exam  Vitals reviewed.    Constitutional:       Appearance: Normal appearance.   HENT:      Head: Normocephalic and atraumatic.      Nose: Nose normal.   Eyes:      Extraocular Movements: Extraocular movements intact.      Conjunctiva/sclera: Conjunctivae normal.   Cardiovascular:      Rate and Rhythm: Normal rate and regular rhythm.      Pulses: Normal pulses.      Heart sounds: Normal heart sounds.   Pulmonary:      Effort: Pulmonary effort is normal. No respiratory distress.      Breath sounds: Normal breath sounds.   Musculoskeletal:         General: Normal range of motion.   Skin:     General: Skin is warm and dry.   Neurological:      General: No focal deficit present.      Mental Status: She is alert and oriented to person, place, and time.      Cranial Nerves: No cranial nerve deficit.      Comments: Mild tremor noted about her lips.    Psychiatric:         Mood and Affect: Mood normal.         Behavior: Behavior normal.         Thought Content: Thought content normal.             Result Review :                           Assessment and Plan      Diagnoses and all orders for this visit:    1. Essential tremor (Primary)  Comments:  Chronic, increasing beta blocker w/ plan for close f/u in 4 wks. Increasing to 80mg today. pt to call in 2 wks w/ update w/ plan to increase to 120mg daily if HR permits.   Orders:  -     propranolol LA (INDERAL LA) 80 MG 24 hr capsule; Take 1 capsule by mouth Daily.  Dispense: 30 capsule; Refill: 1    2. Nasal congestion  3. Cough:   Likely due to PND. Pt w/ hx of asthma on atrovent. Refilled atrovent.   -     ipratropium (ATROVENT) 0.06 % nasal spray; Administer 2 sprays into the nostril(s) as directed by provider 3 (Three) Times a Day.  Dispense: 15 mL; Refill: 12            Increasing propanolol today w/ plan to consider primidone vs topiramate.               Follow Up     Return in about 4 weeks (around 1/1/2025) for tremor, nasal congestion .    Patient was given instructions and counseling  regarding her condition or for health maintenance advice. Please see specific information pulled into the AVS if appropriate.     Tressa Schulte MD   Internal Medicine-Pediatrics

## 2025-01-22 ENCOUNTER — TELEPHONE (OUTPATIENT)
Dept: INTERNAL MEDICINE | Facility: CLINIC | Age: 70
End: 2025-01-22
Payer: MEDICARE

## 2025-01-22 DIAGNOSIS — M25.571 CHRONIC PAIN OF BOTH ANKLES: Primary | ICD-10-CM

## 2025-01-22 DIAGNOSIS — M25.572 CHRONIC PAIN OF BOTH ANKLES: Primary | ICD-10-CM

## 2025-01-22 DIAGNOSIS — G89.29 CHRONIC PAIN OF BOTH ANKLES: Primary | ICD-10-CM

## 2025-01-22 NOTE — TELEPHONE ENCOUNTER
Caller: Stormy Hoffman    Relationship: Self    Best call back number: [phone]     What is the medical concern/diagnosis: ORTHOTICS     What specialty or service is being requested: PHYSICAL THERAPY    What is the provider, practice or medical service name: [facility]    What is the office location: [place]    What is the office phone number: [phone]

## 2025-01-29 DIAGNOSIS — G25.0 ESSENTIAL TREMOR: ICD-10-CM

## 2025-01-29 RX ORDER — PROPRANOLOL HYDROCHLORIDE 80 MG/1
80 CAPSULE, EXTENDED RELEASE ORAL DAILY
Qty: 30 CAPSULE | Refills: 1 | Status: SHIPPED | OUTPATIENT
Start: 2025-01-29

## 2025-03-17 DIAGNOSIS — G25.0 ESSENTIAL TREMOR: ICD-10-CM

## 2025-03-17 RX ORDER — PROPRANOLOL HYDROCHLORIDE 80 MG/1
80 CAPSULE, EXTENDED RELEASE ORAL DAILY
Qty: 30 CAPSULE | Refills: 1 | Status: SHIPPED | OUTPATIENT
Start: 2025-03-17

## 2025-03-17 NOTE — TELEPHONE ENCOUNTER
Caller: Garrett Hoffmananuj Angela    Relationship: Self    Best call back number: [phone]    Requested Prescriptions:   Requested Prescriptions     Pending Prescriptions Disp Refills    propranolol LA (INDERAL LA) 80 MG 24 hr capsule 30 capsule 1     Sig: Take 1 capsule by mouth Daily.        Pharmacy where request should be sent: [facility] [place] [address] [facility] [address] - [phone] [facility] [phone] FX     Last office visit with prescribing clinician: 12/4/2024   Last telemedicine visit with prescribing clinician: Visit date not found   Next office visit with prescribing clinician: Visit date not found     Additional details provided by patient: PATIENT IS OUT OF THIS AND WOULD LIKE REFILLS SENT TO PHARMACY.     Does the patient have less than a 3 day supply:  [x] Yes  [] No    Would you like a call back once the refill request has been completed: [x] Yes [] No    If the office needs to give you a call back, can they leave a voicemail: [x] Yes [] No    Naeem Mitchell Rep   03/17/25 15:24 EDT

## 2025-03-24 DIAGNOSIS — Z76.0 MEDICATION REFILL: ICD-10-CM

## 2025-03-24 NOTE — TELEPHONE ENCOUNTER
Caller: Stormy Hoffman    Relationship: Self    Best call back number:   Telephone Information:   Mobile [phone]        Requested Prescriptions:   Requested Prescriptions     Pending Prescriptions Disp Refills    FLUoxetine (PROzac) 20 MG capsule 90 capsule 1     Sig: Take 1 capsule by mouth Daily.        Pharmacy where request should be sent: [facility] [place] [facility] [place] [facility] [address] [facility] [address] - [phone] [facility] [phone] FX     Last office visit with prescribing clinician: 12/4/2024   Last telemedicine visit with prescribing clinician: Visit date not found   Next office visit with prescribing clinician: Visit date not found       Does the patient have less than a 3 day supply:  [x] Yes  [] No      Summer Naeem Hillman Rep   03/24/25 11:51 EDT

## 2025-04-22 DIAGNOSIS — Z76.0 MEDICATION REFILL: ICD-10-CM

## 2025-04-22 RX ORDER — PRAMIPEXOLE DIHYDROCHLORIDE 0.5 MG/1
0.5 TABLET ORAL DAILY
Qty: 90 TABLET | Refills: 1 | Status: SHIPPED | OUTPATIENT
Start: 2025-04-22

## 2025-04-22 NOTE — TELEPHONE ENCOUNTER
Caller: Garrett Hoffmananuj Angela    Relationship: Self    Best call back number: [phone]    Requested Prescriptions:   Requested Prescriptions     Pending Prescriptions Disp Refills    pramipexole (MIRAPEX) 0.5 MG tablet 90 tablet 1     Sig: Take 1 tablet by mouth Daily.        Pharmacy where request should be sent: [facility] [place] [address] [facility] [address] - [phone] [facility] [phone] FX     Last office visit with prescribing clinician: 12/4/2024   Last telemedicine visit with prescribing clinician: Visit date not found   Next office visit with prescribing clinician: Visit date not found     Does the patient have less than a 3 day supply:  [x] Yes  [] No    Would you like a call back once the refill request has been completed: [] Yes [x] No    If the office needs to give you a call back, can they leave a voicemail: [] Yes [x] No    Naeem Berger Rep   04/22/25 12:20 EDT

## 2025-05-12 DIAGNOSIS — G25.0 ESSENTIAL TREMOR: ICD-10-CM

## 2025-05-12 NOTE — TELEPHONE ENCOUNTER
The original prescription was discontinued on 12/4/2024 by Tressa Schulte MD. Renewing this prescription may not be appropriate.

## 2025-05-13 RX ORDER — PROPRANOLOL HYDROCHLORIDE 60 MG/1
60 CAPSULE, EXTENDED RELEASE ORAL DAILY
OUTPATIENT
Start: 2025-05-13

## 2025-05-13 RX ORDER — PROPRANOLOL HYDROCHLORIDE 80 MG/1
80 CAPSULE, EXTENDED RELEASE ORAL DAILY
Qty: 30 CAPSULE | Refills: 1 | Status: SHIPPED | OUTPATIENT
Start: 2025-05-13